# Patient Record
Sex: FEMALE | Race: WHITE | Employment: OTHER | URBAN - METROPOLITAN AREA
[De-identification: names, ages, dates, MRNs, and addresses within clinical notes are randomized per-mention and may not be internally consistent; named-entity substitution may affect disease eponyms.]

---

## 2017-02-01 ENCOUNTER — HOSPITAL ENCOUNTER (OUTPATIENT)
Dept: PHARMACY | Age: 82
Discharge: HOME | End: 2017-02-01
Payer: MEDICARE

## 2017-02-01 DIAGNOSIS — I48.91 ATRIAL FIBRILLATION, UNSPECIFIED TYPE (HCC): ICD-10-CM

## 2017-02-01 LAB
INR BLD: 3.7
PROTIME: 44.8 SECONDS

## 2017-02-01 PROCEDURE — G0463 HOSPITAL OUTPT CLINIC VISIT: HCPCS | Performed by: PHARMACIST

## 2017-02-01 PROCEDURE — 85610 PROTHROMBIN TIME: CPT | Performed by: PHARMACIST

## 2017-02-15 ENCOUNTER — HOSPITAL ENCOUNTER (OUTPATIENT)
Dept: PHARMACY | Age: 82
Discharge: HOME | End: 2017-02-15
Payer: MEDICARE

## 2017-02-15 DIAGNOSIS — I48.91 ATRIAL FIBRILLATION, UNSPECIFIED TYPE (HCC): ICD-10-CM

## 2017-02-15 LAB
INR BLD: 2.7
PROTIME: 32 SECONDS

## 2017-02-15 PROCEDURE — 85610 PROTHROMBIN TIME: CPT

## 2017-02-15 PROCEDURE — G0463 HOSPITAL OUTPT CLINIC VISIT: HCPCS

## 2017-03-09 ENCOUNTER — HOSPITAL ENCOUNTER (OUTPATIENT)
Dept: PHARMACY | Age: 82
Discharge: HOME | End: 2017-03-09
Payer: MEDICARE

## 2017-03-09 DIAGNOSIS — I48.91 ATRIAL FIBRILLATION, UNSPECIFIED TYPE (HCC): ICD-10-CM

## 2017-03-09 LAB
INR BLD: 4.5
PROTIME: 53.7 SECONDS

## 2017-03-09 PROCEDURE — G0463 HOSPITAL OUTPT CLINIC VISIT: HCPCS | Performed by: PHARMACIST

## 2017-03-09 PROCEDURE — 85610 PROTHROMBIN TIME: CPT | Performed by: PHARMACIST

## 2017-03-09 RX ORDER — WARFARIN SODIUM 2 MG/1
TABLET ORAL
Qty: 10 TABLET | Refills: 0 | Status: SHIPPED | OUTPATIENT
Start: 2017-03-09 | End: 2017-05-30 | Stop reason: DRUGHIGH

## 2017-03-13 ENCOUNTER — HOSPITAL ENCOUNTER (OUTPATIENT)
Dept: PHARMACY | Age: 82
Discharge: HOME | End: 2017-03-13
Payer: MEDICARE

## 2017-03-13 DIAGNOSIS — I48.91 ATRIAL FIBRILLATION, UNSPECIFIED TYPE (HCC): ICD-10-CM

## 2017-03-13 LAB
INR BLD: 4
PROTIME: 47.7 SECONDS

## 2017-03-13 PROCEDURE — 85610 PROTHROMBIN TIME: CPT

## 2017-03-13 PROCEDURE — G0463 HOSPITAL OUTPT CLINIC VISIT: HCPCS

## 2017-03-13 RX ORDER — PROPAFENONE HYDROCHLORIDE 300 MG/1
275 TABLET, COATED ORAL EVERY 8 HOURS
COMMUNITY
End: 2017-09-28

## 2017-03-20 ENCOUNTER — HOSPITAL ENCOUNTER (OUTPATIENT)
Dept: WOMENS IMAGING | Age: 82
Discharge: HOME | End: 2017-03-20
Payer: MEDICARE

## 2017-03-20 DIAGNOSIS — Z12.39 BREAST CANCER SCREENING: ICD-10-CM

## 2017-03-20 PROCEDURE — G0202 SCR MAMMO BI INCL CAD: HCPCS

## 2017-03-27 ENCOUNTER — HOSPITAL ENCOUNTER (OUTPATIENT)
Dept: PHARMACY | Age: 82
Discharge: HOME | End: 2017-03-27
Payer: MEDICARE

## 2017-03-27 DIAGNOSIS — I48.91 ATRIAL FIBRILLATION, UNSPECIFIED TYPE (HCC): ICD-10-CM

## 2017-03-27 LAB
INR BLD: 2.9
PROTIME: 34.7 SECONDS

## 2017-03-27 PROCEDURE — G0463 HOSPITAL OUTPT CLINIC VISIT: HCPCS

## 2017-03-27 PROCEDURE — 85610 PROTHROMBIN TIME: CPT

## 2017-04-17 ENCOUNTER — HOSPITAL ENCOUNTER (OUTPATIENT)
Dept: PHARMACY | Age: 82
Discharge: HOME | End: 2017-04-17
Payer: MEDICARE

## 2017-04-17 DIAGNOSIS — I48.91 ATRIAL FIBRILLATION, UNSPECIFIED TYPE (HCC): ICD-10-CM

## 2017-04-17 LAB
INR BLD: 3
PROTIME: 35.5 SECONDS

## 2017-04-17 PROCEDURE — 85610 PROTHROMBIN TIME: CPT

## 2017-04-17 PROCEDURE — G0463 HOSPITAL OUTPT CLINIC VISIT: HCPCS

## 2017-04-17 RX ORDER — AMLODIPINE BESYLATE 2.5 MG/1
2.5 TABLET ORAL DAILY
COMMUNITY
End: 2017-04-17 | Stop reason: CLARIF

## 2017-04-17 RX ORDER — AMLODIPINE BESYLATE 2.5 MG/1
2.5 TABLET ORAL DAILY
Status: DISCONTINUED | OUTPATIENT
Start: 2017-04-17 | End: 2017-04-17 | Stop reason: CLARIF

## 2017-04-17 RX ORDER — AMLODIPINE BESYLATE 2.5 MG/1
2.5 TABLET ORAL DAILY
COMMUNITY
End: 2018-12-28 | Stop reason: ALTCHOICE

## 2017-04-17 RX ORDER — AMLODIPINE BESYLATE 2.5 MG/1
2.5 TABLET ORAL DAILY
Status: CANCELLED | OUTPATIENT
Start: 2017-04-17

## 2017-05-08 ENCOUNTER — HOSPITAL ENCOUNTER (OUTPATIENT)
Dept: PHARMACY | Age: 82
Discharge: HOME | End: 2017-05-08
Payer: MEDICARE

## 2017-05-08 DIAGNOSIS — I48.91 ATRIAL FIBRILLATION, UNSPECIFIED TYPE (HCC): ICD-10-CM

## 2017-05-08 LAB
INR BLD: 3.1
PROTIME: 37.3 SECONDS

## 2017-05-08 PROCEDURE — 99211 OFF/OP EST MAY X REQ PHY/QHP: CPT

## 2017-05-08 PROCEDURE — 85610 PROTHROMBIN TIME: CPT

## 2017-05-30 ENCOUNTER — HOSPITAL ENCOUNTER (OUTPATIENT)
Dept: PHARMACY | Age: 82
Discharge: HOME | End: 2017-05-30
Payer: MEDICARE

## 2017-05-30 DIAGNOSIS — I48.91 ATRIAL FIBRILLATION, UNSPECIFIED TYPE (HCC): ICD-10-CM

## 2017-05-30 LAB
INR BLD: 2.2
PROTIME: 26.3 SECONDS

## 2017-05-30 PROCEDURE — 99211 OFF/OP EST MAY X REQ PHY/QHP: CPT

## 2017-05-30 PROCEDURE — 85610 PROTHROMBIN TIME: CPT

## 2017-05-30 RX ORDER — WARFARIN SODIUM 2.5 MG/1
TABLET ORAL
Qty: 90 TABLET | Refills: 1 | Status: SHIPPED | OUTPATIENT
Start: 2017-05-30 | End: 2017-12-01 | Stop reason: SDUPTHER

## 2017-06-27 ENCOUNTER — HOSPITAL ENCOUNTER (OUTPATIENT)
Dept: PHARMACY | Age: 82
Setting detail: THERAPIES SERIES
Discharge: HOME | End: 2017-06-27
Payer: MEDICARE

## 2017-06-27 DIAGNOSIS — I48.91 ATRIAL FIBRILLATION, UNSPECIFIED TYPE (HCC): ICD-10-CM

## 2017-06-27 LAB
INR BLD: 2.4
PROTIME: 29.3 SECONDS

## 2017-06-27 PROCEDURE — 99211 OFF/OP EST MAY X REQ PHY/QHP: CPT

## 2017-06-27 PROCEDURE — 85610 PROTHROMBIN TIME: CPT

## 2017-08-03 ENCOUNTER — HOSPITAL ENCOUNTER (OUTPATIENT)
Dept: PHARMACY | Age: 82
Setting detail: THERAPIES SERIES
Discharge: HOME | End: 2017-08-03
Payer: MEDICARE

## 2017-08-03 DIAGNOSIS — I48.91 ATRIAL FIBRILLATION, UNSPECIFIED TYPE (HCC): ICD-10-CM

## 2017-08-03 LAB
INR BLD: 1.8
PROTIME: 22.2 SECONDS

## 2017-08-03 PROCEDURE — 99211 OFF/OP EST MAY X REQ PHY/QHP: CPT

## 2017-08-03 PROCEDURE — 85610 PROTHROMBIN TIME: CPT

## 2017-09-07 ENCOUNTER — HOSPITAL ENCOUNTER (OUTPATIENT)
Dept: PHARMACY | Age: 82
Setting detail: THERAPIES SERIES
Discharge: HOME | End: 2017-09-07
Payer: MEDICARE

## 2017-09-07 DIAGNOSIS — I48.91 ATRIAL FIBRILLATION, UNSPECIFIED TYPE (HCC): ICD-10-CM

## 2017-09-07 LAB
INR BLD: 1.9
PROTIME: 23.3 SECONDS

## 2017-09-07 PROCEDURE — 85610 PROTHROMBIN TIME: CPT

## 2017-09-07 PROCEDURE — 99211 OFF/OP EST MAY X REQ PHY/QHP: CPT

## 2017-09-08 ENCOUNTER — ANTI-COAG VISIT (OUTPATIENT)
Dept: PHARMACY | Age: 82
End: 2017-09-08

## 2017-09-08 DIAGNOSIS — I48.91 ATRIAL FIBRILLATION, UNSPECIFIED TYPE (HCC): ICD-10-CM

## 2017-09-26 ENCOUNTER — HOSPITAL ENCOUNTER (OUTPATIENT)
Dept: LAB | Age: 82
Discharge: HOME | End: 2017-09-26
Payer: MEDICARE

## 2017-09-26 LAB
ANION GAP SERPL CALCULATED.3IONS-SCNC: 19 MEQ/L (ref 7–13)
BUN BLDV-MCNC: 23 MG/DL (ref 8–23)
CALCIUM SERPL-MCNC: 9.7 MG/DL (ref 8.6–10.2)
CHLORIDE BLD-SCNC: 103 MEQ/L (ref 98–107)
CO2: 24 MEQ/L (ref 22–29)
CREAT SERPL-MCNC: 0.86 MG/DL (ref 0.5–0.9)
GFR AFRICAN AMERICAN: >60
GFR NON-AFRICAN AMERICAN: >60
GLUCOSE BLD-MCNC: 69 MG/DL (ref 74–109)
POTASSIUM SERPL-SCNC: 5.4 MEQ/L (ref 3.5–5.1)
SODIUM BLD-SCNC: 146 MEQ/L (ref 132–144)

## 2017-09-26 PROCEDURE — 80048 BASIC METABOLIC PNL TOTAL CA: CPT

## 2017-09-28 ENCOUNTER — HOSPITAL ENCOUNTER (OUTPATIENT)
Dept: PHARMACY | Age: 82
Setting detail: THERAPIES SERIES
Discharge: HOME | End: 2017-09-28
Payer: MEDICARE

## 2017-09-28 DIAGNOSIS — I48.91 ATRIAL FIBRILLATION, UNSPECIFIED TYPE (HCC): ICD-10-CM

## 2017-09-28 LAB
INR BLD: 1.6
PROTIME: 18.9 SECONDS

## 2017-09-28 PROCEDURE — 85610 PROTHROMBIN TIME: CPT

## 2017-09-28 PROCEDURE — 99211 OFF/OP EST MAY X REQ PHY/QHP: CPT

## 2017-09-28 RX ORDER — SOTALOL HYDROCHLORIDE 120 MG/1
120 TABLET ORAL 2 TIMES DAILY
COMMUNITY

## 2017-10-10 ENCOUNTER — HOSPITAL ENCOUNTER (OUTPATIENT)
Dept: LAB | Age: 82
Discharge: HOME | End: 2017-10-10
Payer: MEDICARE

## 2017-10-10 LAB
ANION GAP SERPL CALCULATED.3IONS-SCNC: 16 MEQ/L (ref 7–13)
BUN BLDV-MCNC: 17 MG/DL (ref 8–23)
CALCIUM SERPL-MCNC: 9.6 MG/DL (ref 8.6–10.2)
CHLORIDE BLD-SCNC: 98 MEQ/L (ref 98–107)
CO2: 24 MEQ/L (ref 22–29)
CREAT SERPL-MCNC: 1 MG/DL (ref 0.5–0.9)
GFR AFRICAN AMERICAN: >60
GFR NON-AFRICAN AMERICAN: 52.9
GLUCOSE BLD-MCNC: 96 MG/DL (ref 74–109)
POTASSIUM SERPL-SCNC: 4.8 MEQ/L (ref 3.5–5.1)
SODIUM BLD-SCNC: 138 MEQ/L (ref 132–144)

## 2017-10-10 PROCEDURE — 80048 BASIC METABOLIC PNL TOTAL CA: CPT

## 2017-10-12 ENCOUNTER — HOSPITAL ENCOUNTER (OUTPATIENT)
Dept: PHARMACY | Age: 82
Setting detail: THERAPIES SERIES
Discharge: HOME | End: 2017-10-12
Payer: MEDICARE

## 2017-10-12 DIAGNOSIS — I48.91 ATRIAL FIBRILLATION, UNSPECIFIED TYPE (HCC): ICD-10-CM

## 2017-10-12 LAB
INR BLD: 1.9
PROTIME: 23.2 SECONDS

## 2017-10-12 PROCEDURE — 85610 PROTHROMBIN TIME: CPT | Performed by: PHARMACIST

## 2017-10-12 PROCEDURE — 99211 OFF/OP EST MAY X REQ PHY/QHP: CPT | Performed by: PHARMACIST

## 2017-10-12 NOTE — PROGRESS NOTES
Ms. Nathaniel Fang is a 80 y.o. y/o female with history of Afib who presents today for anticoagulation monitoring and adjustment. INR 1.9 is sl subtherapeutic for this patient (goal range 2-3) and is reflective of 17.5 mg TWD  Patient verifies current dosing regimen, patient able to verbally recall dose  Patient reports no  missed doses since last INR   Patient denies s/sx clotting and/or stroke  Patient denies hematuria, epistaxis, rectal bleeding  Patient denies changes in diet, alcohol, or tobacco use  Reviewed medication list and drug allergies with patient, updated any medication additions or modifications accordingly  Patient also denies any pending medical or dental procedures scheduled at this time  Since patients dosage was adjusted last visit and the INR has increased 0.3 units, patient was instructed to continue 17.5mg TWD and RTC 2 weeks. IF INR is still below 2 the dosage may be increased.

## 2017-10-25 ENCOUNTER — HOSPITAL ENCOUNTER (OUTPATIENT)
Dept: PHARMACY | Age: 82
Setting detail: THERAPIES SERIES
Discharge: HOME | End: 2017-10-25
Payer: MEDICARE

## 2017-10-25 DIAGNOSIS — I48.91 ATRIAL FIBRILLATION, UNSPECIFIED TYPE (HCC): ICD-10-CM

## 2017-10-25 LAB
INR BLD: 1.8
PROTIME: 21.9 SECONDS

## 2017-10-25 PROCEDURE — 99211 OFF/OP EST MAY X REQ PHY/QHP: CPT

## 2017-10-25 PROCEDURE — 85610 PROTHROMBIN TIME: CPT

## 2017-10-25 NOTE — PROGRESS NOTES
Ms. Yeni Galvan is a 80 y.o. y/o female with history of Afib who presents today for anticoagulation monitoring and adjustment. INR 1.8 is subtherapeutic for this patient (goal range 2.0-3.0) and is reflective of 17.5 mg TWD  Patient verifies current dosing regimen, patient able to verbally recall dose  Patient reports zero missed doses since last INR   Patient denies s/sx clotting and/or stroke  Patient denies hematuria, epistaxis, rectal bleeding  Patient denies changes in diet, alcohol, or tobacco use  Reviewed medication list and drug allergies with patient, updated any medication additions or modifications accordingly  Patient also denies any pending medical or dental procedures scheduled at this time  Patient was instructed to start new plan of 20 mg TWD (a 14% increase) and RTC 2 weeks.    Of note: patient is anxious to increase interval of INR visits as patient does not like coming in every 2 weeks

## 2017-11-10 ENCOUNTER — HOSPITAL ENCOUNTER (OUTPATIENT)
Dept: PHARMACY | Age: 82
Setting detail: THERAPIES SERIES
Discharge: HOME | End: 2017-11-10
Payer: MEDICARE

## 2017-11-10 DIAGNOSIS — I48.91 ATRIAL FIBRILLATION, UNSPECIFIED TYPE (HCC): ICD-10-CM

## 2017-11-10 LAB
INR BLD: 2.4
PROTIME: 28.5 SECONDS

## 2017-11-10 PROCEDURE — 85610 PROTHROMBIN TIME: CPT

## 2017-11-10 PROCEDURE — 99211 OFF/OP EST MAY X REQ PHY/QHP: CPT

## 2017-11-10 NOTE — PROGRESS NOTES
Ms. Fer Cramer is a 80 y.o. y/o female with history of Afib who presents today for anticoagulation monitoring and adjustment.   INR 2.4 is therapeutic for this patient (goal range 2-3) and is reflective of 20 mg TWD  Patient verifies current dosing regimen, patient able to verbally recall dose  Patient reports no  missed doses since last INR   Patient denies s/sx clotting and/or stroke  Patient denies hematuria, epistaxis, rectal bleeding  Patient denies changes in diet, alcohol, or tobacco use  Reviewed medication list and drug allergies with patient, updated any medication additions or modifications accordingly  Patient also denies any pending medical or dental procedures scheduled at this time  Patient was instructed to continue current regimen of 20 mg TWD and RTC 3 weeks    Caryle Amas, PharmD  Staff Pharmacist  11/10/2017 8:05 AM

## 2017-11-20 ENCOUNTER — HOSPITAL ENCOUNTER (OUTPATIENT)
Dept: LAB | Age: 82
Discharge: HOME | End: 2017-11-20
Payer: MEDICARE

## 2017-11-20 LAB
ALBUMIN SERPL-MCNC: 4.1 G/DL (ref 3.9–4.9)
ALP BLD-CCNC: 96 U/L (ref 40–130)
ALT SERPL-CCNC: 15 U/L (ref 0–33)
ANION GAP SERPL CALCULATED.3IONS-SCNC: 16 MEQ/L (ref 7–13)
AST SERPL-CCNC: 14 U/L (ref 0–35)
BILIRUB SERPL-MCNC: 0.6 MG/DL (ref 0–1.2)
BUN BLDV-MCNC: 20 MG/DL (ref 8–23)
CALCIUM SERPL-MCNC: 9.8 MG/DL (ref 8.6–10.2)
CHLORIDE BLD-SCNC: 100 MEQ/L (ref 98–107)
CHOLESTEROL, TOTAL: 199 MG/DL (ref 0–199)
CO2: 25 MEQ/L (ref 22–29)
CREAT SERPL-MCNC: 0.93 MG/DL (ref 0.5–0.9)
FOLATE: 11.5 NG/ML (ref 7.3–26.1)
GFR AFRICAN AMERICAN: >60
GFR NON-AFRICAN AMERICAN: 57.5
GLOBULIN: 3.1 G/DL (ref 2.3–3.5)
GLUCOSE BLD-MCNC: 89 MG/DL (ref 74–109)
HDLC SERPL-MCNC: 70 MG/DL (ref 40–59)
LDL CHOLESTEROL CALCULATED: 103 MG/DL (ref 0–129)
POTASSIUM SERPL-SCNC: 5.4 MEQ/L (ref 3.5–5.1)
SODIUM BLD-SCNC: 141 MEQ/L (ref 132–144)
TOTAL PROTEIN: 7.2 G/DL (ref 6.4–8.1)
TRIGL SERPL-MCNC: 131 MG/DL (ref 0–200)
VITAMIN B-12: >2000 PG/ML (ref 211–946)
VITAMIN D 25-HYDROXY: 55.8 NG/ML (ref 30–100)

## 2017-11-20 PROCEDURE — 80053 COMPREHEN METABOLIC PANEL: CPT

## 2017-11-20 PROCEDURE — 82306 VITAMIN D 25 HYDROXY: CPT

## 2017-11-20 PROCEDURE — 82607 VITAMIN B-12: CPT

## 2017-11-20 PROCEDURE — 82746 ASSAY OF FOLIC ACID SERUM: CPT

## 2017-11-20 PROCEDURE — 80061 LIPID PANEL: CPT

## 2017-11-27 ENCOUNTER — HOSPITAL ENCOUNTER (OUTPATIENT)
Dept: LAB | Age: 82
Discharge: HOME | End: 2017-11-27
Payer: MEDICARE

## 2017-11-27 LAB
ANION GAP SERPL CALCULATED.3IONS-SCNC: 14 MEQ/L (ref 7–13)
BUN BLDV-MCNC: 24 MG/DL (ref 8–23)
CALCIUM SERPL-MCNC: 9.5 MG/DL (ref 8.6–10.2)
CHLORIDE BLD-SCNC: 99 MEQ/L (ref 98–107)
CO2: 26 MEQ/L (ref 22–29)
CREAT SERPL-MCNC: 0.99 MG/DL (ref 0.5–0.9)
GFR AFRICAN AMERICAN: >60
GFR NON-AFRICAN AMERICAN: 53.5
GLUCOSE BLD-MCNC: 91 MG/DL (ref 74–109)
POTASSIUM SERPL-SCNC: 5.2 MEQ/L (ref 3.5–5.1)
SODIUM BLD-SCNC: 139 MEQ/L (ref 132–144)

## 2017-11-27 PROCEDURE — 80048 BASIC METABOLIC PNL TOTAL CA: CPT

## 2017-12-01 ENCOUNTER — HOSPITAL ENCOUNTER (OUTPATIENT)
Dept: PHARMACY | Age: 82
Setting detail: THERAPIES SERIES
Discharge: HOME | End: 2017-12-01
Payer: MEDICARE

## 2017-12-01 DIAGNOSIS — I48.91 ATRIAL FIBRILLATION, UNSPECIFIED TYPE (HCC): ICD-10-CM

## 2017-12-01 LAB
INR BLD: 2.5
PROTIME: 30.6 SECONDS

## 2017-12-01 PROCEDURE — 99211 OFF/OP EST MAY X REQ PHY/QHP: CPT

## 2017-12-01 PROCEDURE — 85610 PROTHROMBIN TIME: CPT

## 2017-12-01 RX ORDER — WARFARIN SODIUM 2.5 MG/1
TABLET ORAL
Qty: 120 TABLET | Refills: 1 | Status: SHIPPED | OUTPATIENT
Start: 2017-12-01 | End: 2018-06-01 | Stop reason: SDUPTHER

## 2017-12-01 NOTE — PROGRESS NOTES
Ms. Maribel Reyes is a 80 y.o. y/o female with history of Afib who presents today for anticoagulation monitoring and adjustment.   INR 2.5 is therapeutic for this patient (goal range 2-3) and is reflective of 20 mg TWD  Patient verifies current dosing regimen, patient able to verbally recall dose  Patient reports no  missed doses since last INR   Patient denies s/sx clotting and/or stroke  Patient denies hematuria, epistaxis, rectal bleeding  Patient denies changes in diet, alcohol, or tobacco use  Reviewed medication list and drug allergies with patient, updated any medication additions or modifications accordingly  Patient also denies any pending medical or dental procedures scheduled at this time  Patient was instructed to continue current regimen of 20 mg TWD and RTC 3 weeks    Gilson Moss, PharmD  Staff Pharmacist  12/1/2017 8:28 AM

## 2017-12-22 ENCOUNTER — HOSPITAL ENCOUNTER (OUTPATIENT)
Dept: PHARMACY | Age: 82
Setting detail: THERAPIES SERIES
Discharge: HOME | End: 2017-12-22
Payer: MEDICARE

## 2017-12-22 DIAGNOSIS — I48.91 ATRIAL FIBRILLATION, UNSPECIFIED TYPE (HCC): ICD-10-CM

## 2017-12-22 LAB
INR BLD: 2.1
PROTIME: 25.3 SECONDS

## 2017-12-22 PROCEDURE — 85610 PROTHROMBIN TIME: CPT

## 2017-12-22 PROCEDURE — 99211 OFF/OP EST MAY X REQ PHY/QHP: CPT

## 2018-01-19 ENCOUNTER — HOSPITAL ENCOUNTER (OUTPATIENT)
Dept: PHARMACY | Age: 83
Setting detail: THERAPIES SERIES
Discharge: HOME | End: 2018-01-19
Payer: MEDICARE

## 2018-01-19 DIAGNOSIS — I48.91 ATRIAL FIBRILLATION, UNSPECIFIED TYPE (HCC): ICD-10-CM

## 2018-01-19 LAB
INR BLD: 3.8
PROTIME: 45.8 SECONDS

## 2018-01-19 PROCEDURE — 85610 PROTHROMBIN TIME: CPT

## 2018-01-19 PROCEDURE — 99211 OFF/OP EST MAY X REQ PHY/QHP: CPT

## 2018-02-16 ENCOUNTER — HOSPITAL ENCOUNTER (OUTPATIENT)
Dept: PHARMACY | Age: 83
Setting detail: THERAPIES SERIES
Discharge: HOME | End: 2018-02-16
Payer: MEDICARE

## 2018-02-16 DIAGNOSIS — I48.91 ATRIAL FIBRILLATION, UNSPECIFIED TYPE (HCC): ICD-10-CM

## 2018-02-16 LAB
INR BLD: 2.6
PROTIME: 31.8 SECONDS

## 2018-02-16 PROCEDURE — 99211 OFF/OP EST MAY X REQ PHY/QHP: CPT

## 2018-02-16 PROCEDURE — 85610 PROTHROMBIN TIME: CPT

## 2018-03-16 ENCOUNTER — HOSPITAL ENCOUNTER (OUTPATIENT)
Dept: PHARMACY | Age: 83
Setting detail: THERAPIES SERIES
Discharge: HOME | End: 2018-03-16
Payer: MEDICARE

## 2018-03-16 DIAGNOSIS — I48.91 ATRIAL FIBRILLATION, UNSPECIFIED TYPE (HCC): ICD-10-CM

## 2018-03-16 LAB
INR BLD: 2.4
PROTIME: 28.7 SECONDS

## 2018-03-16 PROCEDURE — 99211 OFF/OP EST MAY X REQ PHY/QHP: CPT

## 2018-03-16 PROCEDURE — 85610 PROTHROMBIN TIME: CPT

## 2018-04-10 ENCOUNTER — HOSPITAL ENCOUNTER (OUTPATIENT)
Dept: LAB | Age: 83
Discharge: HOME | End: 2018-04-10
Payer: MEDICARE

## 2018-04-10 LAB
ANION GAP SERPL CALCULATED.3IONS-SCNC: 19 MEQ/L (ref 7–13)
BUN BLDV-MCNC: 16 MG/DL (ref 8–23)
CALCIUM SERPL-MCNC: 9.8 MG/DL (ref 8.6–10.2)
CHLORIDE BLD-SCNC: 97 MEQ/L (ref 98–107)
CO2: 24 MEQ/L (ref 22–29)
CREAT SERPL-MCNC: 0.92 MG/DL (ref 0.5–0.9)
GFR AFRICAN AMERICAN: >60
GFR NON-AFRICAN AMERICAN: 58.2
GLUCOSE BLD-MCNC: 89 MG/DL (ref 74–109)
POTASSIUM SERPL-SCNC: 5.3 MEQ/L (ref 3.5–5.1)
SODIUM BLD-SCNC: 140 MEQ/L (ref 132–144)

## 2018-04-10 PROCEDURE — 80048 BASIC METABOLIC PNL TOTAL CA: CPT

## 2018-04-20 ENCOUNTER — HOSPITAL ENCOUNTER (OUTPATIENT)
Dept: PHARMACY | Age: 83
Setting detail: THERAPIES SERIES
Discharge: HOME | End: 2018-04-20
Payer: MEDICARE

## 2018-04-20 DIAGNOSIS — I48.91 ATRIAL FIBRILLATION, UNSPECIFIED TYPE (HCC): ICD-10-CM

## 2018-04-20 LAB
INR BLD: 2.8
PROTIME: 34 SECONDS

## 2018-04-20 PROCEDURE — 99211 OFF/OP EST MAY X REQ PHY/QHP: CPT | Performed by: PHARMACIST

## 2018-04-20 PROCEDURE — 85610 PROTHROMBIN TIME: CPT | Performed by: PHARMACIST

## 2018-06-01 ENCOUNTER — HOSPITAL ENCOUNTER (OUTPATIENT)
Dept: PHARMACY | Age: 83
Setting detail: THERAPIES SERIES
Discharge: HOME | End: 2018-06-01
Payer: MEDICARE

## 2018-06-01 DIAGNOSIS — I48.91 ATRIAL FIBRILLATION, UNSPECIFIED TYPE (HCC): ICD-10-CM

## 2018-06-01 LAB
INR BLD: 2.1
PROTIME: 25.6 SECONDS

## 2018-06-01 PROCEDURE — 99211 OFF/OP EST MAY X REQ PHY/QHP: CPT | Performed by: PHARMACIST

## 2018-06-01 PROCEDURE — 85610 PROTHROMBIN TIME: CPT | Performed by: PHARMACIST

## 2018-06-01 RX ORDER — WARFARIN SODIUM 2.5 MG/1
TABLET ORAL
Qty: 120 TABLET | Refills: 1 | Status: SHIPPED | OUTPATIENT
Start: 2018-06-01 | End: 2019-01-18 | Stop reason: SDUPTHER

## 2018-06-14 ENCOUNTER — HOSPITAL ENCOUNTER (OUTPATIENT)
Dept: PULMONOLOGY | Age: 83
Discharge: HOME | End: 2018-06-14
Payer: MEDICARE

## 2018-06-14 PROCEDURE — 94060 EVALUATION OF WHEEZING: CPT

## 2018-06-14 PROCEDURE — 6360000002 HC RX W HCPCS: Performed by: INTERNAL MEDICINE

## 2018-06-14 PROCEDURE — 94060 EVALUATION OF WHEEZING: CPT | Performed by: INTERNAL MEDICINE

## 2018-06-14 RX ORDER — ALBUTEROL SULFATE 2.5 MG/3ML
2.5 SOLUTION RESPIRATORY (INHALATION) ONCE
Status: COMPLETED | OUTPATIENT
Start: 2018-06-14 | End: 2018-06-14

## 2018-06-14 RX ADMIN — ALBUTEROL SULFATE 2.5 MG: 2.5 SOLUTION RESPIRATORY (INHALATION) at 09:40

## 2018-07-13 ENCOUNTER — HOSPITAL ENCOUNTER (OUTPATIENT)
Dept: PHARMACY | Age: 83
Setting detail: THERAPIES SERIES
Discharge: HOME | End: 2018-07-13
Payer: MEDICARE

## 2018-07-13 DIAGNOSIS — I48.91 ATRIAL FIBRILLATION, UNSPECIFIED TYPE (HCC): ICD-10-CM

## 2018-07-13 LAB
INR BLD: 2.6
PROTIME: 30.7 SECONDS

## 2018-07-13 PROCEDURE — 99211 OFF/OP EST MAY X REQ PHY/QHP: CPT

## 2018-07-13 PROCEDURE — 85610 PROTHROMBIN TIME: CPT

## 2018-07-13 NOTE — PROGRESS NOTES
Ms. Cynthia Cervantes is a 80 y.o. y/o female with history of Afib who presents today for anticoagulation monitoring and adjustment.   INR 2.6 is therapeutic for this patient (goal range 2-3) and is reflective of 20 mg TWD  Patient verifies current dosing regimen, patient able to verbally recall dose  Patient reports 0  missed doses since last INR   Patient denies s/sx clotting and/or stroke  Patient denies hematuria, epistaxis, rectal bleeding  Patient denies changes in diet, alcohol, or tobacco use  Reviewed medication list and drug allergies with patient, updated any medication additions or modifications accordingly  Patient also denies any pending medical or dental procedures scheduled at this time  Patient was instructed to continue 20 mg TWD and RTC 6 weeks

## 2018-08-24 ENCOUNTER — HOSPITAL ENCOUNTER (OUTPATIENT)
Dept: PHARMACY | Age: 83
Setting detail: THERAPIES SERIES
Discharge: HOME | End: 2018-08-24
Payer: MEDICARE

## 2018-08-24 DIAGNOSIS — I48.91 ATRIAL FIBRILLATION, UNSPECIFIED TYPE (HCC): ICD-10-CM

## 2018-08-24 LAB
INR BLD: 2.6
PROTIME: 30.9 SECONDS

## 2018-08-24 PROCEDURE — 85610 PROTHROMBIN TIME: CPT | Performed by: PHARMACIST

## 2018-08-24 PROCEDURE — 99211 OFF/OP EST MAY X REQ PHY/QHP: CPT | Performed by: PHARMACIST

## 2018-09-20 ENCOUNTER — HOSPITAL ENCOUNTER (OUTPATIENT)
Dept: LAB | Age: 83
Discharge: HOME | End: 2018-09-20
Payer: MEDICARE

## 2018-09-20 LAB
ALBUMIN SERPL-MCNC: 4.5 G/DL (ref 3.9–4.9)
ALP BLD-CCNC: 107 U/L (ref 40–130)
ALT SERPL-CCNC: 14 U/L (ref 0–33)
ANION GAP SERPL CALCULATED.3IONS-SCNC: 22 MEQ/L (ref 7–13)
AST SERPL-CCNC: 14 U/L (ref 0–35)
BILIRUB SERPL-MCNC: 0.6 MG/DL (ref 0–1.2)
BUN BLDV-MCNC: 15 MG/DL (ref 8–23)
CALCIUM SERPL-MCNC: 10.1 MG/DL (ref 8.6–10.2)
CHLORIDE BLD-SCNC: 101 MEQ/L (ref 98–107)
CHOLESTEROL, TOTAL: 196 MG/DL (ref 0–199)
CO2: 21 MEQ/L (ref 22–29)
CREAT SERPL-MCNC: 0.75 MG/DL (ref 0.5–0.9)
GFR AFRICAN AMERICAN: >60
GFR NON-AFRICAN AMERICAN: >60
GLOBULIN: 3.1 G/DL (ref 2.3–3.5)
GLUCOSE BLD-MCNC: 94 MG/DL (ref 74–109)
HCT VFR BLD CALC: 41.6 % (ref 37–47)
HDLC SERPL-MCNC: 69 MG/DL (ref 40–59)
HEMOGLOBIN: 13.7 G/DL (ref 12–16)
LDL CHOLESTEROL CALCULATED: 103 MG/DL (ref 0–129)
MCH RBC QN AUTO: 32.2 PG (ref 27–31.3)
MCHC RBC AUTO-ENTMCNC: 33 % (ref 33–37)
MCV RBC AUTO: 97.6 FL (ref 82–100)
PDW BLD-RTO: 14.5 % (ref 11.5–14.5)
PLATELET # BLD: 203 K/UL (ref 130–400)
POTASSIUM SERPL-SCNC: 4.3 MEQ/L (ref 3.5–5.1)
RBC # BLD: 4.26 M/UL (ref 4.2–5.4)
SODIUM BLD-SCNC: 144 MEQ/L (ref 132–144)
TOTAL PROTEIN: 7.6 G/DL (ref 6.4–8.1)
TRIGL SERPL-MCNC: 121 MG/DL (ref 0–200)
WBC # BLD: 5.2 K/UL (ref 4.8–10.8)

## 2018-09-20 PROCEDURE — 80061 LIPID PANEL: CPT

## 2018-09-20 PROCEDURE — 85027 COMPLETE CBC AUTOMATED: CPT

## 2018-09-20 PROCEDURE — 80053 COMPREHEN METABOLIC PANEL: CPT

## 2018-10-05 ENCOUNTER — HOSPITAL ENCOUNTER (OUTPATIENT)
Dept: PHARMACY | Age: 83
Setting detail: THERAPIES SERIES
Discharge: HOME | End: 2018-10-05
Payer: MEDICARE

## 2018-10-05 LAB
INR BLD: 2.5
PROTIME: 30.1 SECONDS

## 2018-10-05 PROCEDURE — 99211 OFF/OP EST MAY X REQ PHY/QHP: CPT

## 2018-10-05 PROCEDURE — 85610 PROTHROMBIN TIME: CPT

## 2018-10-05 NOTE — PROGRESS NOTES
Ms. Collette Pester is a 80 y.o. y/o female with history of Afib who presents today for anticoagulation monitoring and adjustment.   INR 2.5 is therapeutic for this patient (goal range 2-3) and is reflective of 20 mg TWD  Patient verifies current dosing regimen, patient able to verbally recall dose  Patient reports 0 missed doses since last INR   Patient denies s/sx clotting and/or stroke  Patient denies hematuria, epistaxis, rectal bleeding  Patient denies changes in diet, alcohol, or tobacco use  Reviewed medication list and drug allergies with patient, updated any medication additions or modifications accordingly  Patient also denies any pending medical or dental procedures scheduled at this time  Patient was instructed to continue 20 mg TWD and RTC 6 weeks    Andrew Haney, PharmD  10/5/18 8:24 AM

## 2018-10-25 ENCOUNTER — HOSPITAL ENCOUNTER (OUTPATIENT)
Dept: WOMENS IMAGING | Age: 83
Discharge: HOME | End: 2018-10-27
Payer: MEDICARE

## 2018-10-25 DIAGNOSIS — Z12.31 ENCOUNTER FOR SCREENING MAMMOGRAM FOR BREAST CANCER: ICD-10-CM

## 2018-10-25 PROCEDURE — 77067 SCR MAMMO BI INCL CAD: CPT

## 2018-11-16 ENCOUNTER — HOSPITAL ENCOUNTER (OUTPATIENT)
Dept: PHARMACY | Age: 83
Setting detail: THERAPIES SERIES
Discharge: HOME | End: 2018-11-16
Payer: MEDICARE

## 2018-11-16 DIAGNOSIS — I48.91 ATRIAL FIBRILLATION, UNSPECIFIED TYPE (HCC): ICD-10-CM

## 2018-11-16 PROCEDURE — 99211 OFF/OP EST MAY X REQ PHY/QHP: CPT

## 2018-11-16 PROCEDURE — 85610 PROTHROMBIN TIME: CPT

## 2018-12-28 ENCOUNTER — HOSPITAL ENCOUNTER (OUTPATIENT)
Dept: PHARMACY | Age: 83
Setting detail: THERAPIES SERIES
Discharge: HOME | End: 2018-12-28
Payer: MEDICARE

## 2018-12-28 DIAGNOSIS — I48.91 ATRIAL FIBRILLATION, UNSPECIFIED TYPE (HCC): ICD-10-CM

## 2018-12-28 LAB — INTERNATIONAL NORMALIZATION RATIO, POC: 2.7

## 2018-12-28 PROCEDURE — 99211 OFF/OP EST MAY X REQ PHY/QHP: CPT | Performed by: PHARMACIST

## 2018-12-28 PROCEDURE — 85610 PROTHROMBIN TIME: CPT | Performed by: PHARMACIST

## 2019-01-18 RX ORDER — WARFARIN SODIUM 2.5 MG/1
TABLET ORAL
Qty: 120 TABLET | Refills: 1 | Status: SHIPPED | OUTPATIENT
Start: 2019-01-18 | End: 2019-08-05 | Stop reason: SDUPTHER

## 2019-02-05 ENCOUNTER — HOSPITAL ENCOUNTER (OUTPATIENT)
Dept: PHARMACY | Age: 84
Setting detail: THERAPIES SERIES
Discharge: HOME | End: 2019-02-05
Payer: MEDICARE

## 2019-02-05 DIAGNOSIS — I48.91 ATRIAL FIBRILLATION, UNSPECIFIED TYPE (HCC): ICD-10-CM

## 2019-02-05 LAB — INTERNATIONAL NORMALIZATION RATIO, POC: 3

## 2019-02-05 PROCEDURE — 99211 OFF/OP EST MAY X REQ PHY/QHP: CPT | Performed by: PHARMACIST

## 2019-02-05 PROCEDURE — 85610 PROTHROMBIN TIME: CPT | Performed by: PHARMACIST

## 2019-03-18 ENCOUNTER — HOSPITAL ENCOUNTER (OUTPATIENT)
Dept: LAB | Age: 84
Discharge: HOME | End: 2019-03-18
Payer: MEDICARE

## 2019-03-18 LAB
ALBUMIN SERPL-MCNC: 4.1 G/DL (ref 3.5–4.6)
ALP BLD-CCNC: 107 U/L (ref 40–130)
ALT SERPL-CCNC: 12 U/L (ref 0–33)
ANION GAP SERPL CALCULATED.3IONS-SCNC: 19 MEQ/L (ref 9–15)
AST SERPL-CCNC: 14 U/L (ref 0–35)
BILIRUB SERPL-MCNC: 0.7 MG/DL (ref 0.2–0.7)
BUN BLDV-MCNC: 19 MG/DL (ref 8–23)
CALCIUM SERPL-MCNC: 9.8 MG/DL (ref 8.5–9.9)
CHLORIDE BLD-SCNC: 100 MEQ/L (ref 95–107)
CHOLESTEROL, TOTAL: 183 MG/DL (ref 0–199)
CO2: 24 MEQ/L (ref 20–31)
CREAT SERPL-MCNC: 0.96 MG/DL (ref 0.5–0.9)
GFR AFRICAN AMERICAN: >60
GFR NON-AFRICAN AMERICAN: 55.3
GLOBULIN: 3.5 G/DL (ref 2.3–3.5)
GLUCOSE BLD-MCNC: 84 MG/DL (ref 70–99)
HDLC SERPL-MCNC: 70 MG/DL (ref 40–59)
LDL CHOLESTEROL CALCULATED: 91 MG/DL (ref 0–129)
POTASSIUM SERPL-SCNC: 4.2 MEQ/L (ref 3.4–4.9)
SODIUM BLD-SCNC: 143 MEQ/L (ref 135–144)
TOTAL PROTEIN: 7.6 G/DL (ref 6.3–8)
TRIGL SERPL-MCNC: 109 MG/DL (ref 0–150)

## 2019-03-18 PROCEDURE — 80053 COMPREHEN METABOLIC PANEL: CPT

## 2019-03-18 PROCEDURE — 80061 LIPID PANEL: CPT

## 2019-03-19 ENCOUNTER — HOSPITAL ENCOUNTER (OUTPATIENT)
Dept: PHARMACY | Age: 84
Setting detail: THERAPIES SERIES
Discharge: HOME | End: 2019-03-19
Payer: MEDICARE

## 2019-03-19 DIAGNOSIS — I48.91 ATRIAL FIBRILLATION, UNSPECIFIED TYPE (HCC): ICD-10-CM

## 2019-03-19 LAB — INTERNATIONAL NORMALIZATION RATIO, POC: 3

## 2019-03-19 PROCEDURE — 85610 PROTHROMBIN TIME: CPT

## 2019-03-19 PROCEDURE — 99211 OFF/OP EST MAY X REQ PHY/QHP: CPT

## 2019-04-30 ENCOUNTER — HOSPITAL ENCOUNTER (OUTPATIENT)
Dept: PHARMACY | Age: 84
Setting detail: THERAPIES SERIES
Discharge: HOME | End: 2019-04-30
Payer: MEDICARE

## 2019-04-30 DIAGNOSIS — I48.91 ATRIAL FIBRILLATION, UNSPECIFIED TYPE (HCC): ICD-10-CM

## 2019-04-30 LAB — INTERNATIONAL NORMALIZATION RATIO, POC: 2.9

## 2019-04-30 PROCEDURE — 85610 PROTHROMBIN TIME: CPT

## 2019-04-30 PROCEDURE — 99211 OFF/OP EST MAY X REQ PHY/QHP: CPT

## 2019-04-30 NOTE — PROGRESS NOTES
Ms. Hernan Ortiz is a 80 y.o. y/o female with history of Afib who presents today for anticoagulation monitoring and adjustment.   INR 2.9 is therapeutic for this patient (goal range 2-3) and is reflective of 20 mg TWD  Patient verifies current dosing regimen, patient able to verbally recall dose  Patient reports 0  missed doses since last INR   Patient denies s/sx clotting and/or stroke  Patient denies hematuria, epistaxis, rectal bleeding  Patient denies changes in diet, alcohol, or tobacco use  Reviewed medication list and drug allergies with patient, updated any medication additions or modifications accordingly  Patient also denies any pending medical or dental procedures scheduled at this time  Patient was instructed to continue 20 mg TWD and RTC 6 weeks

## 2019-06-07 ENCOUNTER — HOSPITAL ENCOUNTER (OUTPATIENT)
Dept: PHARMACY | Age: 84
Setting detail: THERAPIES SERIES
Discharge: HOME | End: 2019-06-07
Payer: MEDICARE

## 2019-06-07 DIAGNOSIS — I48.91 ATRIAL FIBRILLATION, UNSPECIFIED TYPE (HCC): ICD-10-CM

## 2019-06-07 PROCEDURE — 85610 PROTHROMBIN TIME: CPT

## 2019-06-07 PROCEDURE — 99211 OFF/OP EST MAY X REQ PHY/QHP: CPT

## 2019-06-07 NOTE — PROGRESS NOTES
Ms. Lawrence Plummer is a 80 y.o. y/o female with history of Afib who presents today for anticoagulation monitoring and adjustment.   INR 3.4 is supra-therapeutic for this patient (goal range 2-3) and is reflective of 20 mg TWD  Patient verifies current dosing regimen, patient able to verbally recall dose  Patient reports 0  missed doses since last INR   Patient denies s/sx clotting and/or stroke  Patient denies hematuria, epistaxis, rectal bleeding  Patient denies changes in diet, alcohol, or tobacco use  Reviewed medication list and drug allergies with patient, updated any medication additions or modifications accordingly-Patient is on day 5 of 7 days of doxycycline therapy due to possible infection (per patient, no pneumonia)   Patient also denies any pending medical or dental procedures scheduled at this time  Patient was instructed to hold warfarin therapy today only, then continue 20mg TWD and RTC 3 weeks, unless patient does not feel better and returns to see her primary care physician and is prescribed another antimicrobial agent     Lyric Gaspar, PharmD   6/7/2019 10:38 AM

## 2019-06-28 ENCOUNTER — HOSPITAL ENCOUNTER (OUTPATIENT)
Dept: PHARMACY | Age: 84
Setting detail: THERAPIES SERIES
Discharge: HOME | End: 2019-06-28
Payer: MEDICARE

## 2019-06-28 DIAGNOSIS — I48.91 ATRIAL FIBRILLATION, UNSPECIFIED TYPE (HCC): ICD-10-CM

## 2019-06-28 LAB — INTERNATIONAL NORMALIZATION RATIO, POC: 4.3

## 2019-06-28 PROCEDURE — 99211 OFF/OP EST MAY X REQ PHY/QHP: CPT

## 2019-06-28 PROCEDURE — 85610 PROTHROMBIN TIME: CPT

## 2019-06-28 NOTE — PROGRESS NOTES
Ms. Jordan Callahan is a 80 y.o. y/o female with history of Afib who presents today for anticoagulation monitoring and adjustment. INR 4.3 is supratherapeutic for this patient (goal range 2-3) and is reflective of 20 mg TWD  Patient verifies current dosing regimen, patient able to verbally recall dose  Patient reports no missed doses since last INR   Patient denies s/sx clotting and/or stroke  Patient denies hematuria, epistaxis, rectal bleeding  Patient denies changes in diet, alcohol, or tobacco use  Patient states she has experienced anxiety over her health lately.   Reviewed medication list and drug allergies with patient, updated any medication additions or modifications accordingly  Patient also denies any pending medical or dental procedures scheduled at this time  Patient was instructed to hold warfarin today, then reduce TWD of warfarin to 17.5mg and RTC in 2 weeks

## 2019-07-12 ENCOUNTER — HOSPITAL ENCOUNTER (OUTPATIENT)
Dept: PHARMACY | Age: 84
Setting detail: THERAPIES SERIES
Discharge: HOME | End: 2019-07-12
Payer: MEDICARE

## 2019-07-12 DIAGNOSIS — I48.91 ATRIAL FIBRILLATION, UNSPECIFIED TYPE (HCC): ICD-10-CM

## 2019-07-12 LAB — INTERNATIONAL NORMALIZATION RATIO, POC: 2.1

## 2019-07-12 PROCEDURE — 99211 OFF/OP EST MAY X REQ PHY/QHP: CPT

## 2019-07-12 PROCEDURE — 85610 PROTHROMBIN TIME: CPT

## 2019-08-05 RX ORDER — WARFARIN SODIUM 2.5 MG/1
TABLET ORAL
Qty: 120 TABLET | Refills: 1 | Status: SHIPPED | OUTPATIENT
Start: 2019-08-05 | End: 2019-11-27 | Stop reason: SDUPTHER

## 2019-08-07 ENCOUNTER — HOSPITAL ENCOUNTER (OUTPATIENT)
Dept: PHARMACY | Age: 84
Setting detail: THERAPIES SERIES
Discharge: HOME | End: 2019-08-07
Payer: MEDICARE

## 2019-08-07 DIAGNOSIS — I48.91 ATRIAL FIBRILLATION, UNSPECIFIED TYPE (HCC): ICD-10-CM

## 2019-08-07 PROCEDURE — 85610 PROTHROMBIN TIME: CPT

## 2019-08-07 PROCEDURE — 99211 OFF/OP EST MAY X REQ PHY/QHP: CPT

## 2019-09-11 ENCOUNTER — HOSPITAL ENCOUNTER (OUTPATIENT)
Dept: PHARMACY | Age: 84
Setting detail: THERAPIES SERIES
Discharge: HOME | End: 2019-09-11
Payer: MEDICARE

## 2019-09-11 DIAGNOSIS — I48.91 ATRIAL FIBRILLATION, UNSPECIFIED TYPE (HCC): ICD-10-CM

## 2019-09-11 LAB
INTERNATIONAL NORMALIZATION RATIO, POC: 3
INTERNATIONAL NORMALIZATION RATIO, POC: 3

## 2019-09-11 PROCEDURE — 99211 OFF/OP EST MAY X REQ PHY/QHP: CPT

## 2019-09-11 PROCEDURE — 85610 PROTHROMBIN TIME: CPT

## 2019-09-30 ENCOUNTER — HOSPITAL ENCOUNTER (OUTPATIENT)
Dept: LAB | Age: 84
Discharge: HOME | End: 2019-09-30
Payer: MEDICARE

## 2019-09-30 LAB
ALBUMIN SERPL-MCNC: 4.1 G/DL (ref 3.5–4.6)
ALP BLD-CCNC: 94 U/L (ref 40–130)
ALT SERPL-CCNC: 12 U/L (ref 0–33)
ANION GAP SERPL CALCULATED.3IONS-SCNC: 15 MEQ/L (ref 9–15)
AST SERPL-CCNC: 11 U/L (ref 0–35)
BILIRUB SERPL-MCNC: 0.7 MG/DL (ref 0.2–0.7)
BUN BLDV-MCNC: 17 MG/DL (ref 8–23)
CALCIUM SERPL-MCNC: 9.5 MG/DL (ref 8.5–9.9)
CHLORIDE BLD-SCNC: 101 MEQ/L (ref 95–107)
CO2: 26 MEQ/L (ref 20–31)
CREAT SERPL-MCNC: 0.86 MG/DL (ref 0.5–0.9)
GFR AFRICAN AMERICAN: >60
GFR NON-AFRICAN AMERICAN: >60
GLOBULIN: 3.3 G/DL (ref 2.3–3.5)
GLUCOSE BLD-MCNC: 96 MG/DL (ref 70–99)
HCT VFR BLD CALC: 42.3 % (ref 37–47)
HEMOGLOBIN: 13.7 G/DL (ref 12–16)
MCH RBC QN AUTO: 31.4 PG (ref 27–31.3)
MCHC RBC AUTO-ENTMCNC: 32.3 % (ref 33–37)
MCV RBC AUTO: 97.3 FL (ref 82–100)
PDW BLD-RTO: 14.8 % (ref 11.5–14.5)
PLATELET # BLD: 220 K/UL (ref 130–400)
POTASSIUM SERPL-SCNC: 4.5 MEQ/L (ref 3.4–4.9)
RBC # BLD: 4.34 M/UL (ref 4.2–5.4)
SODIUM BLD-SCNC: 142 MEQ/L (ref 135–144)
TOTAL PROTEIN: 7.4 G/DL (ref 6.3–8)
WBC # BLD: 5.8 K/UL (ref 4.8–10.8)

## 2019-09-30 PROCEDURE — 80053 COMPREHEN METABOLIC PANEL: CPT

## 2019-09-30 PROCEDURE — 85027 COMPLETE CBC AUTOMATED: CPT

## 2019-10-22 ENCOUNTER — HOSPITAL ENCOUNTER (OUTPATIENT)
Dept: PHARMACY | Age: 84
Setting detail: THERAPIES SERIES
Discharge: HOME | End: 2019-10-22
Payer: MEDICARE

## 2019-10-22 DIAGNOSIS — I48.91 ATRIAL FIBRILLATION, UNSPECIFIED TYPE (HCC): ICD-10-CM

## 2019-10-22 LAB — INTERNATIONAL NORMALIZATION RATIO, POC: 3

## 2019-10-22 PROCEDURE — 85610 PROTHROMBIN TIME: CPT | Performed by: PHARMACIST

## 2019-10-22 PROCEDURE — 99211 OFF/OP EST MAY X REQ PHY/QHP: CPT | Performed by: PHARMACIST

## 2019-11-04 ENCOUNTER — HOSPITAL ENCOUNTER (OUTPATIENT)
Dept: WOMENS IMAGING | Age: 84
Discharge: HOME | End: 2019-11-06
Payer: MEDICARE

## 2019-11-04 DIAGNOSIS — Z12.31 ENCOUNTER FOR SCREENING MAMMOGRAM FOR BREAST CANCER: ICD-10-CM

## 2019-11-04 PROCEDURE — 77063 BREAST TOMOSYNTHESIS BI: CPT

## 2019-11-27 RX ORDER — WARFARIN SODIUM 2.5 MG/1
TABLET ORAL
Qty: 120 TABLET | Refills: 1 | Status: SHIPPED | OUTPATIENT
Start: 2019-11-27 | End: 2019-12-04 | Stop reason: SDUPTHER

## 2019-12-03 ENCOUNTER — HOSPITAL ENCOUNTER (OUTPATIENT)
Dept: PHARMACY | Age: 84
Setting detail: THERAPIES SERIES
Discharge: HOME | End: 2019-12-03
Payer: MEDICARE

## 2019-12-03 DIAGNOSIS — I48.91 ATRIAL FIBRILLATION, UNSPECIFIED TYPE (HCC): ICD-10-CM

## 2019-12-03 LAB — INTERNATIONAL NORMALIZATION RATIO, POC: 4.1

## 2019-12-03 PROCEDURE — 85610 PROTHROMBIN TIME: CPT

## 2019-12-03 PROCEDURE — 99211 OFF/OP EST MAY X REQ PHY/QHP: CPT

## 2019-12-04 RX ORDER — WARFARIN SODIUM 2.5 MG/1
TABLET ORAL
Qty: 120 TABLET | Refills: 1 | Status: SHIPPED | OUTPATIENT
Start: 2019-12-04 | End: 2020-03-23 | Stop reason: SDUPTHER

## 2020-01-14 ENCOUNTER — HOSPITAL ENCOUNTER (OUTPATIENT)
Dept: PHARMACY | Age: 85
Setting detail: THERAPIES SERIES
Discharge: HOME | End: 2020-01-14
Payer: MEDICARE

## 2020-01-14 LAB — INTERNATIONAL NORMALIZATION RATIO, POC: 2.7

## 2020-01-14 PROCEDURE — 99211 OFF/OP EST MAY X REQ PHY/QHP: CPT

## 2020-01-14 PROCEDURE — 85610 PROTHROMBIN TIME: CPT

## 2020-01-14 NOTE — PROGRESS NOTES
Ms. Jaylin Morin is a 80 y.o. y/o female with history of Afib who presents today for anticoagulation monitoring and adjustment.   INR 2.7 is therapeutic for this patient (goal range 2-3) and is reflective of 20 mg TWD  Patient verifies current dosing regimen, patient able to verbally recall dose  Patient reports no  missed doses since last INR   Patient denies s/sx clotting and/or stroke  Patient denies hematuria, epistaxis, rectal bleeding  Patient denies changes in diet, alcohol, or tobacco use  Reviewed medication list and drug allergies with patient, updated any medication additions or modifications accordingly  Patient also denies any pending medical or dental procedures scheduled at this time  Patient was instructed to continue current regimen of 20 mg TWD and RTC 6 weeks    Andrés Fierro, PharmD, BCPS  Staff Pharmacist  1/14/2020 8:28 AM

## 2020-02-25 ENCOUNTER — HOSPITAL ENCOUNTER (OUTPATIENT)
Dept: PHARMACY | Age: 85
Setting detail: THERAPIES SERIES
Discharge: HOME | End: 2020-02-25
Payer: MEDICARE

## 2020-02-25 LAB — INTERNATIONAL NORMALIZATION RATIO, POC: 3.1

## 2020-02-25 PROCEDURE — 99211 OFF/OP EST MAY X REQ PHY/QHP: CPT

## 2020-02-25 PROCEDURE — 85610 PROTHROMBIN TIME: CPT

## 2020-03-09 ENCOUNTER — HOSPITAL ENCOUNTER (OUTPATIENT)
Dept: LAB | Age: 85
Discharge: HOME | End: 2020-03-09
Payer: MEDICARE

## 2020-03-09 LAB
ALBUMIN SERPL-MCNC: 4 G/DL (ref 3.5–4.6)
ALP BLD-CCNC: 91 U/L (ref 40–130)
ALT SERPL-CCNC: 12 U/L (ref 0–33)
ANION GAP SERPL CALCULATED.3IONS-SCNC: 14 MEQ/L (ref 9–15)
AST SERPL-CCNC: 12 U/L (ref 0–35)
BILIRUB SERPL-MCNC: 0.5 MG/DL (ref 0.2–0.7)
BUN BLDV-MCNC: 20 MG/DL (ref 8–23)
CALCIUM SERPL-MCNC: 9.4 MG/DL (ref 8.5–9.9)
CHLORIDE BLD-SCNC: 103 MEQ/L (ref 95–107)
CHOLESTEROL, TOTAL: 167 MG/DL (ref 0–199)
CO2: 25 MEQ/L (ref 20–31)
CREAT SERPL-MCNC: 0.91 MG/DL (ref 0.5–0.9)
GFR AFRICAN AMERICAN: >60
GFR NON-AFRICAN AMERICAN: 58.7
GLOBULIN: 3 G/DL (ref 2.3–3.5)
GLUCOSE BLD-MCNC: 90 MG/DL (ref 70–99)
HDLC SERPL-MCNC: 66 MG/DL (ref 40–59)
LDL CHOLESTEROL CALCULATED: 79 MG/DL (ref 0–129)
POTASSIUM SERPL-SCNC: 4.5 MEQ/L (ref 3.4–4.9)
SODIUM BLD-SCNC: 142 MEQ/L (ref 135–144)
TOTAL PROTEIN: 7 G/DL (ref 6.3–8)
TRIGL SERPL-MCNC: 109 MG/DL (ref 0–150)

## 2020-03-09 PROCEDURE — 80061 LIPID PANEL: CPT

## 2020-03-09 PROCEDURE — 80053 COMPREHEN METABOLIC PANEL: CPT

## 2020-03-09 PROCEDURE — 36415 COLL VENOUS BLD VENIPUNCTURE: CPT

## 2020-05-11 ENCOUNTER — TELEPHONE (OUTPATIENT)
Dept: PHARMACY | Age: 85
End: 2020-05-11

## 2020-05-12 ENCOUNTER — HOSPITAL ENCOUNTER (OUTPATIENT)
Dept: PHARMACY | Age: 85
Setting detail: THERAPIES SERIES
Discharge: HOME | End: 2020-05-12
Payer: MEDICARE

## 2020-05-12 DIAGNOSIS — I48.91 ATRIAL FIBRILLATION, UNSPECIFIED TYPE (HCC): ICD-10-CM

## 2020-05-12 LAB
INR BLD: 2.4
PROTHROMBIN TIME: 26.4 SEC (ref 12.3–14.9)

## 2020-05-12 PROCEDURE — 99211 OFF/OP EST MAY X REQ PHY/QHP: CPT | Performed by: PHARMACIST

## 2020-06-23 ENCOUNTER — HOSPITAL ENCOUNTER (OUTPATIENT)
Dept: PHARMACY | Age: 85
Setting detail: THERAPIES SERIES
Discharge: HOME | End: 2020-06-23
Payer: MEDICARE

## 2020-06-23 VITALS — TEMPERATURE: 97.2 F

## 2020-06-23 LAB — INTERNATIONAL NORMALIZATION RATIO, POC: 2.8

## 2020-06-23 PROCEDURE — 85610 PROTHROMBIN TIME: CPT | Performed by: PHARMACIST

## 2020-06-23 PROCEDURE — 99211 OFF/OP EST MAY X REQ PHY/QHP: CPT | Performed by: PHARMACIST

## 2020-08-04 ENCOUNTER — HOSPITAL ENCOUNTER (OUTPATIENT)
Dept: PHARMACY | Age: 85
Setting detail: THERAPIES SERIES
Discharge: HOME | End: 2020-08-04
Payer: MEDICARE

## 2020-08-04 LAB — INTERNATIONAL NORMALIZATION RATIO, POC: 2.5

## 2020-08-04 PROCEDURE — 85610 PROTHROMBIN TIME: CPT

## 2020-08-04 PROCEDURE — 99211 OFF/OP EST MAY X REQ PHY/QHP: CPT

## 2020-08-04 NOTE — PROGRESS NOTES
Ms. Dayna Rosales is a 80 y.o. y/o female with history of Afib who presents today for anticoagulation monitoring and adjustment.   INR 2.5 is therapeutic for this patient (goal range 2-3) and is reflective of 20 mg TWD  Patient verifies current dosing regimen, patient able to verbally recall dose  Patient reports no  missed doses since last INR   Patient denies s/sx clotting and/or stroke  Patient denies hematuria, epistaxis, rectal bleeding  Patient denies changes in diet, alcohol, or tobacco use  Reviewed medication list and drug allergies with patient, updated any medication additions or modifications accordingly  Patient also denies any pending medical or dental procedures scheduled at this time  Patient was instructed to continue current regimen of 20 mg TWD and RTC 6 weeks    CLINICAL PHARMACY CONSULT: MED RECONCILIATION/REVIEW [address]: No  Total # of Interventions Recommended: 1  - Maintenance Safety Lab Monitoring #: 1  Total Interventions Accepted: 1  Time Spent (min): 30    Ricardo Holcomb, PharmD, [id number]  Staff Pharmacist  8/4/2020 9:11 AM

## 2020-08-31 ENCOUNTER — HOSPITAL ENCOUNTER (OUTPATIENT)
Dept: LAB | Age: 85
Discharge: HOME | End: 2020-08-31
Payer: MEDICARE

## 2020-09-15 ENCOUNTER — HOSPITAL ENCOUNTER (OUTPATIENT)
Dept: PHARMACY | Age: 85
Setting detail: THERAPIES SERIES
Discharge: HOME | End: 2020-09-15
Payer: MEDICARE

## 2020-09-15 VITALS — TEMPERATURE: 97.6 F

## 2020-09-15 LAB — INTERNATIONAL NORMALIZATION RATIO, POC: 3.7

## 2020-09-15 PROCEDURE — 85610 PROTHROMBIN TIME: CPT

## 2020-09-15 PROCEDURE — 99211 OFF/OP EST MAY X REQ PHY/QHP: CPT

## 2020-09-15 NOTE — PROGRESS NOTES
Ms. Stanford Friedman is a 80 y.o. y/o female with history of Afib who presents today for anticoagulation monitoring and adjustment. INR 3.7 is supratherapeutic for this patient (goal range 2-3) and is reflective of 20 mg TWD  Patient verifies current dosing regimen, patient able to verbally recall dose  Patient reports 0 missed doses since last INR   Patient denies s/sx clotting and/or stroke  Patient denies hematuria, epistaxis, rectal bleeding  Patient denies changes in alcohol, or tobacco use. Patient states may be eating less vit K foods, plans to return to normal diet  Reviewed medication list and drug allergies with patient, updated any medication additions or modifications accordingly  Patient also denies any pending medical or dental procedures scheduled at this time  Patient was instructed to take 1 tablet tomorrow then resume normal schedule (20mg TWD) and RTC 4 weeks      CLINICAL PHARMACY CONSULT: MED RECONCILIATION/REVIEW [address]: No  Total # of Interventions Recommended: 2  - Decreased Dose #: 1  - Maintenance Safety Lab Monitoring #: 1  Total Interventions Accepted: 2  Time Spent (min): 30    Jesus Iraheta PharmD.   9/15/2020

## 2020-09-16 RX ORDER — WARFARIN SODIUM 2.5 MG/1
TABLET ORAL
Qty: 120 TABLET | Refills: 1 | Status: SHIPPED | OUTPATIENT
Start: 2020-09-16 | End: 2020-10-13 | Stop reason: SDUPTHER

## 2020-10-13 ENCOUNTER — HOSPITAL ENCOUNTER (OUTPATIENT)
Dept: PHARMACY | Age: 85
Setting detail: THERAPIES SERIES
Discharge: HOME | End: 2020-10-13
Payer: MEDICARE

## 2020-10-13 VITALS — TEMPERATURE: 97.3 F

## 2020-10-13 LAB — INTERNATIONAL NORMALIZATION RATIO, POC: 3.3

## 2020-10-13 PROCEDURE — 99211 OFF/OP EST MAY X REQ PHY/QHP: CPT | Performed by: PHARMACIST

## 2020-10-13 PROCEDURE — 85610 PROTHROMBIN TIME: CPT | Performed by: PHARMACIST

## 2020-10-13 RX ORDER — WARFARIN SODIUM 2.5 MG/1
TABLET ORAL
Qty: 120 TABLET | Refills: 1 | Status: SHIPPED | OUTPATIENT
Start: 2020-10-13 | End: 2021-09-03 | Stop reason: SDUPTHER

## 2020-10-13 NOTE — PROGRESS NOTES
Ms. Misael Rose is a 80 y.o. y/o female with history of Afib who presents today for anticoagulation monitoring and adjustment. INR 3.3 is sl supratherapeutic for this patient (goal range 2-3) and is reflective of 20 mg TWD  Patient verifies current dosing regimen, patient able to verbally recall dose  Patient reports no  missed doses since last INR   Patient denies s/sx clotting and/or stroke  Patient denies hematuria, epistaxis, rectal bleeding  Patient denies changes in diet, alcohol, or tobacco use  Reviewed medication list and drug allergies with patient, updated any medication additions or modifications accordingly  Patient also denies any pending medical or dental procedures scheduled at this time  Patient was instructed to decrease TWD to 18.75mg and RTC 4 weeks      CLINICAL PHARMACY CONSULT: MED RECONCILIATION/REVIEW [address]: No  Total # of Interventions Recommended: 1  - Decreased Dose #: 1  - Maintenance Safety Lab Monitoring #: 1  Total Interventions Accepted: 1  Time Spent (min): 15    JONAH Flores Ph. 10/13/2020 10:27 AM

## 2020-11-10 ENCOUNTER — HOSPITAL ENCOUNTER (OUTPATIENT)
Dept: PHARMACY | Age: 85
Setting detail: THERAPIES SERIES
Discharge: HOME | End: 2020-11-10
Payer: MEDICARE

## 2020-11-10 VITALS — TEMPERATURE: 97.5 F

## 2020-11-10 LAB — INTERNATIONAL NORMALIZATION RATIO, POC: 2.7

## 2020-11-10 PROCEDURE — 85610 PROTHROMBIN TIME: CPT

## 2020-11-10 PROCEDURE — 99211 OFF/OP EST MAY X REQ PHY/QHP: CPT

## 2020-11-10 NOTE — PROGRESS NOTES
Ms. Caterina Win is a 80 y.o. y/o female with history of Afib who presents today for anticoagulation monitoring and adjustment. Today's INR at 2.7 is therapeutic for this patient (goal range 2 - 3) and is reflective of 18.75 mg TWD  Patient verifies current dosing regimen, patient able to verbally recall dose  Patient reports ZERO  missed doses since last INR   Patient denies s/sx clotting and/or stroke  Patient denies hematuria, epistaxis, rectal bleeding  Patient denies changes in diet, alcohol, or tobacco use  Reviewed medication list and drug allergies with patient, updated any medication additions or modifications accordingly  Patient also denies any pending medical or dental procedures scheduled at this time  Patient was instructed to continue current regimen of 18.75 mg TWD and RTC in 4 weeks    Takes Tylenol 650 mg occasionally ~ 2 tablets per week.        CLINICAL PHARMACY CONSULT: MED RECONCILIATION/REVIEW ADDENDUM    For Pharmacy Admin Tracking Only    PHSO: No  Total # of Interventions Recommended: 1  - Maintenance Safety Lab Monitoring #: 1  Total Interventions Accepted: 1  Time Spent (min): [address]

## 2020-11-20 ENCOUNTER — HOSPITAL ENCOUNTER (OUTPATIENT)
Dept: WOMENS IMAGING | Age: 85
Discharge: HOME | End: 2020-11-22
Payer: MEDICARE

## 2020-11-20 PROCEDURE — 77063 BREAST TOMOSYNTHESIS BI: CPT

## 2020-12-08 ENCOUNTER — TELEPHONE (OUTPATIENT)
Dept: PHARMACY | Age: 85
End: 2020-12-08

## 2020-12-14 ENCOUNTER — HOSPITAL ENCOUNTER (OUTPATIENT)
Dept: PHARMACY | Age: 85
Setting detail: THERAPIES SERIES
Discharge: HOME | End: 2020-12-14
Payer: MEDICARE

## 2020-12-14 VITALS — TEMPERATURE: 97.5 F

## 2020-12-14 LAB — INTERNATIONAL NORMALIZATION RATIO, POC: 2.8

## 2020-12-14 PROCEDURE — 85610 PROTHROMBIN TIME: CPT

## 2020-12-14 PROCEDURE — 99211 OFF/OP EST MAY X REQ PHY/QHP: CPT

## 2020-12-14 NOTE — PROGRESS NOTES
Ms. Norbert Esquivel is a 80 y.o. y/o female with history of Afib who presents today for anticoagulation monitoring and adjustment.   INR 2.8 is therapeutic for this patient (goal range 2-3) and is reflective of 18.75 mg TWD  Patient verifies current dosing regimen, patient able to verbally recall dose  Patient reports zero missed doses since last INR   Patient denies s/sx clotting and/or stroke  Patient denies hematuria, epistaxis, rectal bleeding  Patient denies changes in diet, alcohol, or tobacco use  Reviewed medication list and drug allergies with patient, updated any medication additions or modifications accordingly  Patient also denies any pending medical or dental procedures scheduled at this time  Patient was instructed to continue current regimen of warfarin 18.75 mg TWD and RTC 6 weeks    CLINICAL PHARMACY CONSULT: MED RECONCILIATION/REVIEW [address]: No  Total # of Interventions Recommended: 1  - Maintenance Safety Lab Monitoring #: 1  Total Interventions Accepted: 1  Time Spent (min): [address] Cedric Marshall, PharmD  [address]

## 2021-01-25 ENCOUNTER — HOSPITAL ENCOUNTER (OUTPATIENT)
Dept: PHARMACY | Age: 86
Setting detail: THERAPIES SERIES
Discharge: HOME | End: 2021-01-25
Payer: MEDICARE

## 2021-01-25 VITALS — TEMPERATURE: 97.4 F

## 2021-01-25 DIAGNOSIS — I48.91 ATRIAL FIBRILLATION, UNSPECIFIED TYPE (HCC): ICD-10-CM

## 2021-01-25 LAB — INTERNATIONAL NORMALIZATION RATIO, POC: 3.4

## 2021-01-25 PROCEDURE — 85610 PROTHROMBIN TIME: CPT | Performed by: PHARMACIST

## 2021-01-25 PROCEDURE — 99211 OFF/OP EST MAY X REQ PHY/QHP: CPT | Performed by: PHARMACIST

## 2021-01-25 NOTE — PROGRESS NOTES
Ms. Cony Kinney is a 80 y.o. y/o female with history of Afib who presents today for anticoagulation monitoring and adjustment. INR 3.4 is supratherapeutic for this patient (goal range 2-3) and is reflective of 18.75 mg TWD  Patient verifies current dosing regimen, patient able to verbally recall dose  Patient reports no missed doses since last INR   Patient denies s/sx clotting and/or stroke  Patient denies hematuria, epistaxis, rectal bleeding  Patient denies changes in diet, alcohol, or tobacco use  Reviewed medication list and drug allergies with patient, updated any medication additions or modifications accordingly  Patient also denies any pending medical or dental procedures scheduled at this time  Patient reports eating less green vegetables than usual.   Patient reports taking more tylenol than before, about one tablet per day. Patient was instructed to take a reduced dose of 1.25 mg today, then resume 18.75 mg TWD and RTC 4 weeks    CLINICAL PHARMACY CONSULT: MED RECONCILIATION/REVIEW [address]: No  Total # of Interventions Recommended: 1  - Decreased Dose #: 1  - Maintenance Safety Lab Monitoring #: 1  Total Interventions Accepted: 1  Time Spent (min): 30    Christofer Clark R. Ph. 1/25/2021 9:46 AM

## 2021-02-22 ENCOUNTER — TELEPHONE (OUTPATIENT)
Dept: PHARMACY | Age: 86
End: 2021-02-22

## 2021-02-22 DIAGNOSIS — I48.91 ATRIAL FIBRILLATION, UNSPECIFIED TYPE (HCC): ICD-10-CM

## 2021-02-23 ENCOUNTER — HOSPITAL ENCOUNTER (OUTPATIENT)
Dept: PHARMACY | Age: 86
Setting detail: THERAPIES SERIES
Discharge: HOME | End: 2021-02-23
Payer: MEDICARE

## 2021-02-23 VITALS — TEMPERATURE: 97.1 F

## 2021-02-23 DIAGNOSIS — I48.91 ATRIAL FIBRILLATION, UNSPECIFIED TYPE (HCC): ICD-10-CM

## 2021-02-23 LAB — INTERNATIONAL NORMALIZATION RATIO, POC: 3.5

## 2021-02-23 PROCEDURE — 85610 PROTHROMBIN TIME: CPT

## 2021-02-23 PROCEDURE — 99211 OFF/OP EST MAY X REQ PHY/QHP: CPT

## 2021-02-23 NOTE — PROGRESS NOTES
Ms. Inna Silveira is a 80 y.o. y/o female with history of Afib who presents today for anticoagulation monitoring and adjustment.   INR 3.5 is supra therapeutic for this patient (goal range 2-3   and is reflective of 18.75 mg TWD  Patient verifies current dosing regimen, patient able to verbally recall dose  Patient reports 0  missed doses since last INR   Patient denies s/sx clotting and/or stroke  Patient denies hematuria, epistaxis, rectal bleeding  Patient denies changes in diet, alcohol, or tobacco use  Reviewed medication list and drug allergies with patient, updated any medication additions or modifications accordingly  Patient also denies any pending medical or dental procedures scheduled at this time  Patient has been undergoing stress with health and medical appointments of   INR has been increasing at 18.75 mg TWD from 11/10/20   Patient was instructed to decrease to 17.5 mg TWD (6.7 %)  and  RTC 2 weeks, patient unable to come due to schedule for 4 weeks    CLINICAL PHARMACY CONSULT: MED RECONCILIATION/REVIEW [address]: No  Total # of Interventions Recommended: 2  - Decreased Dose #: 1  - Maintenance Safety Lab Monitoring #: 1    Total Interventions Accepted: 2  Time Spent (min): [address] [facility], [facility]

## 2021-03-24 ENCOUNTER — TELEPHONE (OUTPATIENT)
Dept: PHARMACY | Age: 86
End: 2021-03-24

## 2021-03-24 DIAGNOSIS — I48.91 ATRIAL FIBRILLATION, UNSPECIFIED TYPE (HCC): ICD-10-CM

## 2021-03-25 ENCOUNTER — HOSPITAL ENCOUNTER (OUTPATIENT)
Dept: PHARMACY | Age: 86
Setting detail: THERAPIES SERIES
Discharge: HOME | End: 2021-03-25
Payer: MEDICARE

## 2021-03-25 VITALS — TEMPERATURE: 97.2 F

## 2021-03-25 DIAGNOSIS — I48.91 ATRIAL FIBRILLATION, UNSPECIFIED TYPE (HCC): ICD-10-CM

## 2021-03-25 LAB — INTERNATIONAL NORMALIZATION RATIO, POC: 5.7

## 2021-03-25 PROCEDURE — 99211 OFF/OP EST MAY X REQ PHY/QHP: CPT

## 2021-03-25 PROCEDURE — 85610 PROTHROMBIN TIME: CPT

## 2021-04-01 ENCOUNTER — HOSPITAL ENCOUNTER (OUTPATIENT)
Dept: PHARMACY | Age: 86
Setting detail: THERAPIES SERIES
Discharge: HOME | End: 2021-04-01
Payer: MEDICARE

## 2021-04-01 VITALS — TEMPERATURE: 97.2 F

## 2021-04-01 DIAGNOSIS — I48.91 ATRIAL FIBRILLATION, UNSPECIFIED TYPE (HCC): ICD-10-CM

## 2021-04-01 LAB — INTERNATIONAL NORMALIZATION RATIO, POC: 2.9

## 2021-04-01 PROCEDURE — 99211 OFF/OP EST MAY X REQ PHY/QHP: CPT

## 2021-04-01 PROCEDURE — 85610 PROTHROMBIN TIME: CPT

## 2021-04-01 NOTE — PROGRESS NOTES
Ms. Dale Baer is a 80 y.o. y/o female with history of Afib who presents today for anticoagulation monitoring and adjustment. Due to COVID protocol, extensive cleaning with Virex performed before and after visit. Temperature assessed. Masks worn by both parties the entire visit. Faceshield/googles worn by me. INR 2.9 is therapeutic for this patient (goal range 2-3) and is reflective of 12.5 mg TWD due to supra therapeutic INR  Patient verifies current dosing regimen, patient able to verbally recall dose  Patient reports 2  missed doses since last INR due to critical INR  Patient denies s/sx clotting and/or stroke  Patient denies hematuria, epistaxis, rectal bleeding  Patient denies changes in diet, alcohol, or tobacco use  Reviewed medication list and drug allergies with patient, updated any medication additions or modifications accordingly  Patient also denies any pending medical or dental procedures scheduled at this time  Patient was instructed to start reduced dose of  15 mg TWD and RTC 2 weeks  Pt prefers 4 weeks due to transportation issues.  Understands risks  Pt recently lost     CLINICAL PHARMACY CONSULT: MED RECONCILIATION/REVIEW Ruth  22. Tracking Only    PHSO: No  Total # of Interventions Recommended: 0  Total Interventions Accepted: 0  Time Spent (min): [address]  Staff Pharmacist, [facility]  4/1/2021  10:46 AM

## 2021-04-29 ENCOUNTER — HOSPITAL ENCOUNTER (OUTPATIENT)
Dept: PHARMACY | Age: 86
Setting detail: THERAPIES SERIES
Discharge: HOME | End: 2021-04-29
Payer: MEDICARE

## 2021-04-29 DIAGNOSIS — I48.91 ATRIAL FIBRILLATION, UNSPECIFIED TYPE (HCC): ICD-10-CM

## 2021-04-29 LAB — INTERNATIONAL NORMALIZATION RATIO, POC: 3.1

## 2021-04-29 PROCEDURE — 99211 OFF/OP EST MAY X REQ PHY/QHP: CPT

## 2021-04-29 PROCEDURE — 85610 PROTHROMBIN TIME: CPT

## 2021-04-29 NOTE — PROGRESS NOTES
Ms. Daniel Rios is a 80 y.o. y/o female with history of Afib who presents today for anticoagulation monitoring and adjustment. INR 3.1 is sl supratherapeutic for this patient (goal range 2-3) and is reflective of 17.5 mg TWD  Patient verifies current dosing regimen, patient able to verbally recall dose  Patient reports no  missed doses since last INR   Patient denies s/sx clotting and/or stroke  Patient denies hematuria, epistaxis, rectal bleeding  Patient denies changes in diet, alcohol, or tobacco use  Reviewed medication list and drug allergies with patient, updated any medication additions or modifications accordingly    Patient did not understand instructions at last visit, only skipped warfarin on the Thursday she was in the office then took 1 tab every day.  Has been eating better and 3-4 salads per week    Patient also denies any pending medical or dental procedures scheduled at this time  Patient was instructed to continue 17.5 mg TWD and RTC 4 weeks, pt requested 6 weeks however due to high INRs recently and  passing away I did not feel comfortable scheduling for 6 weeks at this time    For [address] [place] [address] Intervention Detail: Adherence Monitorin and Lab(s) Ordered   Total # of Interventions Recommended: 2   Total # of Interventions Accepted: 2   Time Spent (min): 30    Karthik Santana, PharmD, [facility]  Staff Pharmacist  2021 10:31 AM

## 2021-05-20 LAB
ABO: NORMAL
ANTIBODY SCREEN: NORMAL
FRESH FROZEN PLASMA: NORMAL
INR BLD: 1.6 (ref 0.9–1.1)
INR BLD: 2.8 (ref 0.9–1.1)
PROTHROMBIN TIME: 18.7 SEC (ref 10.1–13)
PROTHROMBIN TIME: 32.6 SEC (ref 10.1–13)
RH TYPE: NORMAL

## 2021-05-21 LAB
BASOPHILS # BLD: 0.01 X10E9/L (ref 0–0.1)
BASOPHILS RELATIVE PERCENT: 0.1 % (ref 0–2)
ERYTHROCYTE [DISTWIDTH] IN BLOOD BY AUTOMATED COUNT: 14.6 % (ref 11.5–14)
HCT VFR BLD CALC: 30.8 % (ref 36–46)
HEMOGLOBIN: 9.7 G/DL (ref 12–16)
IMMATURE GRANULOCYTES %: 0.3 % (ref 0–0.9)
INR BLD: 1.2 (ref 0.9–1.1)
LYMPHOCYTES # BLD: 4.5 % (ref 13–44)
LYMPHOCYTES RELATIVE PERCENT: 0.52 X10E9/L (ref 0.8–3)
MCHC RBC AUTO-ENTMCNC: 31.5 G/DL (ref 32–36)
MCV RBC AUTO: 101 FL (ref 80–100)
MONOCYTES # BLD: 1 X10E9/L (ref 0.05–0.8)
MONOCYTES RELATIVE PERCENT: 8.6 % (ref 2–10)
NEUTROPHILS RELATIVE PERCENT: 86.5 % (ref 40–80)
NEUTROPHILS: 10.08 X10E9/L (ref 1.6–5.5)
PLATELET # BLD: 174 X10E9/L (ref 150–450)
PROTHROMBIN TIME: 13.6 SEC (ref 10.1–13)
RBC # BLD: 3.06 X10E12/L (ref 4–5.2)
WBC: 11.7 X10E9/L (ref 4.4–11.3)

## 2021-05-22 LAB
ANION GAP SERPL CALCULATED.3IONS-SCNC: 13 MMOL/L (ref 10–20)
APPEARANCE: CLEAR
BASOPHILS # BLD: 0.02 X10E9/L (ref 0–0.1)
BASOPHILS RELATIVE PERCENT: 0.2 % (ref 0–2)
BICARBONATE: 24 MMOL/L (ref 21–32)
BILIRUBIN, URINE: NEGATIVE
BLOOD, URINE: NEGATIVE
CALCIUM SERPL-MCNC: 8.8 MG/DL (ref 8.6–10.3)
CHLORIDE BLD-SCNC: 105 MMOL/L (ref 98–107)
COLOR, URINE: YELLOW
CREAT SERPL-MCNC: 1.02 MG/DL (ref 0.5–1)
EOSINOPHIL # BLD: 0.01 X10E9/L (ref 0–0.4)
EOSINOPHILS RELATIVE PERCENT: 0.1 % (ref 0–6)
ERYTHROCYTE [DISTWIDTH] IN BLOOD BY AUTOMATED COUNT: 14.8 % (ref 11.5–14)
GFR AFRICAN AMERICAN: 62 ML/MIN/1.73M2
GFR NON-AFRICAN AMERICAN: 51 ML/MIN/1.73M2
GLUCOSE, URINE: NEGATIVE MG/DL
GLUCOSE: 114 MG/DL (ref 74–99)
HCT VFR BLD CALC: 28.1 % (ref 36–46)
HEMOGLOBIN: 8.9 G/DL (ref 12–16)
IMMATURE GRANULOCYTES %: 0.4 % (ref 0–0.9)
KETONES, URINE: NEGATIVE MG/DL
LEUKOCYTE ESTERASE, URINE: NEGATIVE
LYMPHOCYTES # BLD: 7.7 % (ref 13–44)
LYMPHOCYTES RELATIVE PERCENT: 0.82 X10E9/L (ref 0.8–3)
MCHC RBC AUTO-ENTMCNC: 31.7 G/DL (ref 32–36)
MCV RBC AUTO: 100 FL (ref 80–100)
MONOCYTES # BLD: 1.33 X10E9/L (ref 0.05–0.8)
MONOCYTES RELATIVE PERCENT: 12.5 % (ref 2–10)
NEUTROPHILS RELATIVE PERCENT: 79.1 % (ref 40–80)
NEUTROPHILS: 8.41 X10E9/L (ref 1.6–5.5)
NITRITE, URINE: NEGATIVE
PH UA: 5 (ref 5–8)
PLATELET # BLD: 152 X10E9/L (ref 150–450)
POTASSIUM SERPL-SCNC: 4.4 MMOL/L (ref 3.5–5.3)
PROTEIN UA: NEGATIVE MG/DL
RBC # BLD: 2.81 X10E12/L (ref 4–5.2)
SODIUM BLD-SCNC: 138 MMOL/L (ref 136–145)
SPECIFIC GRAVITY, URINE: 1.01 (ref 1–1)
UREA NITROGEN: 29 MG/DL (ref 6–23)
UROBILINOGEN, URINE: <2 MG/DL (ref 0–1.9)
WBC: 10.6 X10E9/L (ref 4.4–11.3)

## 2021-05-25 ENCOUNTER — TELEPHONE (OUTPATIENT)
Dept: PHARMACY | Age: 86
End: 2021-05-25

## 2021-05-25 DIAGNOSIS — I48.91 ATRIAL FIBRILLATION, UNSPECIFIED TYPE (HCC): Primary | ICD-10-CM

## 2021-05-25 NOTE — TELEPHONE ENCOUNTER
Patient's daughter-in-law called stating patient had a fall and is currently in rehab facility. Expected discharge 2 weeks. Reset tracker for 2 weeks to check on patient.

## 2021-06-08 ENCOUNTER — TELEPHONE (OUTPATIENT)
Dept: PHARMACY | Age: 86
End: 2021-06-08

## 2021-06-08 DIAGNOSIS — I48.91 ATRIAL FIBRILLATION, UNSPECIFIED TYPE (HCC): Primary | ICD-10-CM

## 2021-06-08 NOTE — TELEPHONE ENCOUNTER
No answer on patient phone and no voicemail available at this time when called to check on patient status     Reset POC INR tracker for Friday, when another attempt to check patient status should be made     Brenda Hodge, PharmD   6/8/2021 1:24 PM

## 2021-06-11 ENCOUNTER — TELEPHONE (OUTPATIENT)
Dept: PHARMACY | Age: 86
End: 2021-06-11

## 2021-06-11 DIAGNOSIS — I48.91 ATRIAL FIBRILLATION, UNSPECIFIED TYPE (HCC): Primary | ICD-10-CM

## 2021-06-14 ENCOUNTER — HOSPITAL ENCOUNTER (OUTPATIENT)
Dept: PHARMACY | Age: 86
Setting detail: THERAPIES SERIES
Discharge: HOME | End: 2021-06-14
Payer: MEDICARE

## 2021-06-14 DIAGNOSIS — I48.91 ATRIAL FIBRILLATION, UNSPECIFIED TYPE (HCC): Primary | ICD-10-CM

## 2021-06-14 LAB — INTERNATIONAL NORMALIZATION RATIO, POC: 5.2

## 2021-06-14 PROCEDURE — 99211 OFF/OP EST MAY X REQ PHY/QHP: CPT | Performed by: PHARMACIST

## 2021-06-14 PROCEDURE — 85610 PROTHROMBIN TIME: CPT | Performed by: PHARMACIST

## 2021-06-24 ENCOUNTER — HOSPITAL ENCOUNTER (OUTPATIENT)
Dept: PHARMACY | Age: 86
Setting detail: THERAPIES SERIES
Discharge: HOME | End: 2021-06-24
Payer: MEDICARE

## 2021-06-24 DIAGNOSIS — I48.91 ATRIAL FIBRILLATION, UNSPECIFIED TYPE (HCC): Primary | ICD-10-CM

## 2021-06-24 LAB — INTERNATIONAL NORMALIZATION RATIO, POC: 2.7

## 2021-06-24 PROCEDURE — 85610 PROTHROMBIN TIME: CPT

## 2021-06-24 PROCEDURE — 99211 OFF/OP EST MAY X REQ PHY/QHP: CPT

## 2021-06-24 NOTE — PROGRESS NOTES
Ms. Yemi Bennett is a 80 y.o. y/o female with history of Afib who presents today for anticoagulation monitoring and adjustment.   INR 2.7 is therapeutic for this patient (goal range 2-3) and is reflective of 17.5 mg TWD  Patient verifies current dosing regimen, patient able to verbally recall dose  Patient reports zero missed doses since last INR   Patient denies s/sx clotting and/or stroke  Patient denies hematuria, epistaxis, rectal bleeding  Patient denies changes in diet, alcohol, or tobacco use  Reviewed medication list and drug allergies with patient, updated any medication additions or modifications accordingly  Patient also denies any pending medical or dental procedures scheduled at this time  Patient was instructed to continue current dosing regimen of warfarin 17.5 mg TWD and RTC 3 weeks (patient said transportation can be difficult for her)    For Pharmacy Admin Tracking Only     Intervention Detail: Adherence Monitorin   Total # of Interventions Recommended: 1   Total # of Interventions Accepted: 1   Time Spent (min): [address], PharmD   2021 9:53 AM

## 2021-07-15 ENCOUNTER — HOSPITAL ENCOUNTER (OUTPATIENT)
Dept: PHARMACY | Age: 86
Setting detail: THERAPIES SERIES
Discharge: HOME | End: 2021-07-15
Payer: MEDICARE

## 2021-07-15 DIAGNOSIS — I48.0 PAROXYSMAL ATRIAL FIBRILLATION (HCC): Primary | ICD-10-CM

## 2021-07-15 LAB — INTERNATIONAL NORMALIZATION RATIO, POC: 4.8

## 2021-07-15 PROCEDURE — 99211 OFF/OP EST MAY X REQ PHY/QHP: CPT

## 2021-07-15 PROCEDURE — 85610 PROTHROMBIN TIME: CPT

## 2021-07-15 NOTE — PROGRESS NOTES
Ms. Libby Thompson is a 80 y.o. y/o female with history of Afib who presents today for anticoagulation monitoring and adjustment. INR 4.8 is supra therapeutic for this patient (goal range 2-3) and is reflective of 17.5 mg TWD  Patient verifies current dosing regimen, patient able to verbally recall dose  Patient reports 0  missed doses since last INR   Patient denies s/sx clotting and/or stroke  Patient denies hematuria, epistaxis, rectal bleeding  Patient denies changes in diet, alcohol, or tobacco use  Reviewed medication list and drug allergies with patient, updated any medication additions or modifications accordingly  Patient states taking Tylenol for back and arm pain  Patient also denies any pending medical or dental procedures scheduled at this time  Patient states under much stress after her  passed, had fall, in rehab, then stayed with son.  Patient is now home with somewhat more consistent schedule  Patient was instructed to hold warfarin today and tomorrow, then decrease to 16.25 mg TWD (7.1%) and RTC 1 week, patient unable to make appointment due to transportation, advised to call clinic to make appointment earlier if able    For Pharmacy Admin Tracking Only     Intervention Detail: Dose Adjustment: 2, reason: Therapy Optimization   Total # of Interventions Recommended: 2   Total # of Interventions Accepted: 2   Time Spent (min): 20

## 2021-08-05 ENCOUNTER — HOSPITAL ENCOUNTER (OUTPATIENT)
Dept: PHARMACY | Age: 86
Setting detail: THERAPIES SERIES
Discharge: HOME | End: 2021-08-05
Payer: MEDICARE

## 2021-08-05 LAB — INTERNATIONAL NORMALIZATION RATIO, POC: 2.2

## 2021-08-05 PROCEDURE — 85610 PROTHROMBIN TIME: CPT

## 2021-08-05 PROCEDURE — 99211 OFF/OP EST MAY X REQ PHY/QHP: CPT

## 2021-08-05 NOTE — PROGRESS NOTES
Ms. Michaela Clark is a 80 y.o. y/o female with history of Afib who presents today for anticoagulation monitoring and adjustment.   INR 2.2 is therapeutic for this patient (goal range 2-3) and is reflective of 16.25 mg TWD  Patient verifies current dosing regimen, patient able to verbally recall dose  Patient reports zero missed doses since last INR   Patient denies s/sx clotting and/or stroke  Patient denies hematuria, epistaxis, rectal bleeding  Patient denies changes in diet, alcohol, or tobacco use  Reviewed medication list and drug allergies with patient, updated any medication additions or modifications accordingly  Patient also denies any pending medical or dental procedures scheduled at this time  Patient was instructed to continue current dosing regimen of warfarin 16.25 mg and RTC 4 weeks (last time patient RTC 3 weeks, and she said transportation is an issue)    For Pharmacy Admin Tracking Only     Intervention Detail: Adherence Monitorin   Total # of Interventions Recommended: 1   Total # of Interventions Accepted: 1   Time Spent (min): [address], PharmD   2021 10:07 AM

## 2021-09-02 ENCOUNTER — HOSPITAL ENCOUNTER (OUTPATIENT)
Dept: PHARMACY | Age: 86
Setting detail: THERAPIES SERIES
Discharge: HOME | End: 2021-09-02
Payer: MEDICARE

## 2021-09-02 DIAGNOSIS — I48.91 ATRIAL FIBRILLATION, UNSPECIFIED TYPE (HCC): Primary | ICD-10-CM

## 2021-09-02 LAB — INTERNATIONAL NORMALIZATION RATIO, POC: 3.8

## 2021-09-02 PROCEDURE — 85610 PROTHROMBIN TIME: CPT | Performed by: PHARMACIST

## 2021-09-02 PROCEDURE — 99211 OFF/OP EST MAY X REQ PHY/QHP: CPT | Performed by: PHARMACIST

## 2021-09-03 RX ORDER — WARFARIN SODIUM 2.5 MG/1
TABLET ORAL
Qty: 100 TABLET | Refills: 1 | Status: SHIPPED | OUTPATIENT
Start: 2021-09-03 | End: 2022-08-18 | Stop reason: SDUPTHER

## 2021-09-30 ENCOUNTER — HOSPITAL ENCOUNTER (OUTPATIENT)
Dept: PHARMACY | Age: 86
Setting detail: THERAPIES SERIES
Discharge: HOME | End: 2021-09-30
Payer: MEDICARE

## 2021-09-30 LAB — INTERNATIONAL NORMALIZATION RATIO, POC: 2.1

## 2021-09-30 PROCEDURE — 99211 OFF/OP EST MAY X REQ PHY/QHP: CPT

## 2021-09-30 PROCEDURE — 85610 PROTHROMBIN TIME: CPT

## 2021-09-30 NOTE — PROGRESS NOTES
Ms. Nya Mejia is a 80 y.o. y/o female with history of Afib who presents today for anticoagulation monitoring and adjustment.   INR 2.1 is therapeutic for this patient (goal range 2-3) and is reflective of 15 mg TWD  Patient verifies current dosing regimen, patient able to verbally recall dose  Patient reports 0 missed doses since last INR   Patient denies s/sx clotting and/or stroke  Patient denies hematuria, epistaxis, rectal bleeding  Patient denies changes in diet, alcohol, or tobacco use  Patient has been eating less salads in the last few weeks  Reviewed medication list and drug allergies with patient, updated any medication additions or modifications accordingly  Patient also denies any pending medical or dental procedures scheduled at this time  Patient was instructed to continue 15 mg TWD and RTC 4 weeks - patient prefers 4 weeks or longer due to transportation issues    For Pharmacy Admin Tracking Only     Intervention Detail: Lab(s) Ordered   Total # of Interventions Recommended: 1   Total # of Interventions Accepted: 1   Time Spent (min): 30    Emy Fuchs PharmD  PGY-1 Pharmacy Resident  9/30/2021 9:57 AM

## 2021-10-28 ENCOUNTER — HOSPITAL ENCOUNTER (OUTPATIENT)
Dept: PHARMACY | Age: 86
Setting detail: THERAPIES SERIES
Discharge: HOME | End: 2021-10-28
Payer: MEDICARE

## 2021-10-28 DIAGNOSIS — I48.91 ATRIAL FIBRILLATION, UNSPECIFIED TYPE (HCC): Primary | ICD-10-CM

## 2021-10-28 LAB — INTERNATIONAL NORMALIZATION RATIO, POC: 2.4

## 2021-10-28 PROCEDURE — 85610 PROTHROMBIN TIME: CPT

## 2021-10-28 PROCEDURE — 99211 OFF/OP EST MAY X REQ PHY/QHP: CPT

## 2021-10-28 NOTE — PROGRESS NOTES
Ms. Isreal Inman is a 80 y.o. y/o female with history of Afib who presents today for anticoagulation monitoring and adjustment. INR 2.4 is therapeutic for this patient (goal range 2-3) and is reflective of 15 mg TWD  Patient verifies current dosing regimen, patient able to verbally recall dose  Patient reports 0 missed doses since last INR   Patient denies s/sx clotting and/or stroke  Patient denies hematuria, epistaxis, rectal bleeding  Patient denies changes in diet, alcohol, or tobacco use  -Patient endorses that her appetite is much less since her  passed away in March.     Reviewed medication list and drug allergies with patient, updated any medication additions or modifications accordingly  Patient also denies any pending medical or dental procedures scheduled at this time    Patient was instructed to continue current 15 mg TWD and RTC 6 weeks    For Pharmacy Admin Tracking Only     Intervention Detail: Adherence Monitorin and Lab(s) Ordered   Total # of Interventions Recommended: 1   Total # of Interventions Accepted: 1   Time Spent (min): 15    Jayde Johnson PharmD  PGY-1 Pharmacy Resident  10/28/2021 9:55 AM

## 2021-12-09 ENCOUNTER — HOSPITAL ENCOUNTER (OUTPATIENT)
Dept: PHARMACY | Age: 86
Setting detail: THERAPIES SERIES
Discharge: HOME | End: 2021-12-09
Payer: MEDICARE

## 2021-12-09 DIAGNOSIS — I48.91 ATRIAL FIBRILLATION, UNSPECIFIED TYPE (HCC): Primary | ICD-10-CM

## 2021-12-09 LAB — INTERNATIONAL NORMALIZATION RATIO, POC: 2.4

## 2021-12-09 PROCEDURE — 85610 PROTHROMBIN TIME: CPT

## 2021-12-09 PROCEDURE — 99211 OFF/OP EST MAY X REQ PHY/QHP: CPT

## 2022-01-20 ENCOUNTER — HOSPITAL ENCOUNTER (OUTPATIENT)
Dept: PHARMACY | Age: 87
Setting detail: THERAPIES SERIES
Discharge: HOME | End: 2022-01-20
Payer: MEDICARE

## 2022-01-20 DIAGNOSIS — I48.91 ATRIAL FIBRILLATION, UNSPECIFIED TYPE (HCC): Primary | ICD-10-CM

## 2022-01-20 LAB — INTERNATIONAL NORMALIZATION RATIO, POC: 2.3

## 2022-01-20 PROCEDURE — 85610 PROTHROMBIN TIME: CPT

## 2022-01-20 PROCEDURE — 99211 OFF/OP EST MAY X REQ PHY/QHP: CPT

## 2022-01-20 NOTE — PROGRESS NOTES
Ms. Joya Bumpers is a 80 y.o. y/o female with history of Afib who presents today for anticoagulation monitoring and adjustment.   INR 2.3 is therapeutic for this patient (goal range 2-3) and is reflective of 15 mg TWD  Patient verifies current dosing regimen, patient able to verbally recall dose  Patient reports 0  missed doses since last INR   Patient denies s/sx clotting and/or stroke  Patient denies hematuria, epistaxis, rectal bleeding  Patient denies changes in diet, alcohol, or tobacco use  Reviewed medication list and drug allergies with patient, updated any medication additions or modifications accordingly  Patient also denies any pending medical or dental procedures scheduled at this time  Patient was instructed to continue 15 mg TWD and RTC 6 weeks    For Pharmacy Admin Tracking Only     Intervention Detail: Adherence Monitorin   Total # of Interventions Recommended: 1   Total # of Interventions Accepted: 1   Time Spent (min): 15

## 2022-02-10 LAB
FOLATE: 12.1 NG/ML
VITAMIN B-12: 300 PG/ML (ref 211–911)
VITAMIN D 25-HYDROXY: 41 NG/ML

## 2022-03-03 ENCOUNTER — HOSPITAL ENCOUNTER (OUTPATIENT)
Dept: PHARMACY | Age: 87
Setting detail: THERAPIES SERIES
Discharge: HOME | End: 2022-03-03
Payer: MEDICARE

## 2022-03-03 DIAGNOSIS — I48.91 ATRIAL FIBRILLATION, UNSPECIFIED TYPE (HCC): Primary | ICD-10-CM

## 2022-03-03 LAB — INTERNATIONAL NORMALIZATION RATIO, POC: 3.6

## 2022-03-03 PROCEDURE — 85610 PROTHROMBIN TIME: CPT

## 2022-03-03 PROCEDURE — 99211 OFF/OP EST MAY X REQ PHY/QHP: CPT

## 2022-03-03 NOTE — PROGRESS NOTES
Ms. Jey Greer is a 80 y.o. y/o female with history of Afib who presents today for anticoagulation monitoring and adjustment. INR 3.6 is supratherapeutic for this patient (goal range 2-3) and is reflective of 17.5 mg TWD  Patient verifies current dosing regimen, patient able to verbally recall dose  Patient reports no  missed doses since last INR   Patient denies s/sx clotting and/or stroke  Patient denies hematuria, epistaxis, rectal bleeding  Patient denies changes in diet, alcohol, or tobacco use  Reviewed medication list and drug allergies with patient, updated any medication additions or modifications accordingly    Patient had TIA's on feb 9 and went to [facility] where they increase her warfarin dosing to 2.5 mg daily and started aspirin. Patient states she had 2 INR's since being released and both were > 3.      Patient also denies any pending medical or dental procedures scheduled at this time  Patient was instructed to hold warfarin tonight then restart previous regimen of 15 mg TWD and RTC 2 weeks    For Pharmacy Admin Tracking Only     Intervention Detail: Dose Adjustment: 1, reason: Therapy Optimization and Lab(s) Ordered   Total # of Interventions Recommended: 2   Total # of Interventions Accepted: 2   Time Spent (min): 30    Shai MarieD, [facility]  Staff Pharmacist  3/3/2022 10:09 AM

## 2022-03-17 ENCOUNTER — HOSPITAL ENCOUNTER (OUTPATIENT)
Dept: PHARMACY | Age: 87
Setting detail: THERAPIES SERIES
Discharge: HOME | End: 2022-03-17
Payer: MEDICARE

## 2022-03-17 DIAGNOSIS — I48.20 CHRONIC ATRIAL FIBRILLATION (HCC): Primary | ICD-10-CM

## 2022-03-17 LAB — INTERNATIONAL NORMALIZATION RATIO, POC: 2.8

## 2022-03-17 PROCEDURE — 85610 PROTHROMBIN TIME: CPT | Performed by: PHARMACIST

## 2022-03-17 PROCEDURE — 99211 OFF/OP EST MAY X REQ PHY/QHP: CPT | Performed by: PHARMACIST

## 2022-03-17 NOTE — PROGRESS NOTES
Ms. Alexander Reed is a 80 y.o. y/o female with history of Afib who presents today for anticoagulation monitoring and adjustment. INR 2.8 is therapeutic for this patient (goal range 2-3) and is reflective of 15 mg TWD  Patient verifies current dosing regimen, patient able to verbally recall dose  Patient reports no  missed doses since last INR   Patient denies s/sx clotting and/or stroke  Patient denies hematuria, epistaxis, rectal bleeding  Patient denies changes in diet, alcohol, or tobacco use  Reviewed medication list and drug allergies with patient, updated any medication additions or modifications accordingly  Patient also denies any pending medical or dental procedures scheduled at this time  Patient was instructed to continue 15mg TWD and RTC 3 weeks. Pt request 4 week RTC d/t transportation issues.       For Pharmacy Admin Tracking Only     Intervention Detail: Adherence Monitorin   Total # of Interventions Recommended: 2   Total # of Interventions Accepted: 2   Time Spent (min): 30

## 2022-04-14 ENCOUNTER — HOSPITAL ENCOUNTER (OUTPATIENT)
Dept: PHARMACY | Age: 87
Setting detail: THERAPIES SERIES
Discharge: HOME | End: 2022-04-14
Payer: MEDICARE

## 2022-04-14 DIAGNOSIS — I48.91 ATRIAL FIBRILLATION, UNSPECIFIED TYPE (HCC): Primary | ICD-10-CM

## 2022-04-14 LAB — INTERNATIONAL NORMALIZATION RATIO, POC: 3.4

## 2022-04-14 PROCEDURE — 85610 PROTHROMBIN TIME: CPT

## 2022-04-14 PROCEDURE — 99211 OFF/OP EST MAY X REQ PHY/QHP: CPT

## 2022-04-14 NOTE — PROGRESS NOTES
Ms. Stoney Garrett is a 80 y.o. y/o female with history of Afib who presents today for anticoagulation monitoring and adjustment. Due to COVID protocol, extensive cleaning with Virex performed before and after visit. Masks worn by both parties the entire visit. INR 3.4 is supra-therapeutic for this patient (goal range 2-3) and is reflective of 15 mg TWD. Pt has been on the higher end of INR range. Patient verifies current dosing regimen, patient able to verbally recall dose  Patient reports 0 missed doses since last INR   Patient denies s/sx clotting and/or stroke  Patient denies hematuria, epistaxis, rectal bleeding  Patient denies changes in diet, alcohol, or tobacco use  Reviewed medication list and drug allergies with patient, updated any medication additions or modifications accordingly  Patient also denies any pending medical or dental procedures scheduled at this time  Pt has taken more Tylenol recently. Plans to continue this. Hips and groin have been hurting her. Patient was instructed to take partial dose today and reduce to 13.75 mg TWD and RTC 2 weeks. Pt prefers 4 weeks due to transportation issues, understands risks.      For Pharmacy Admin Tracking Only     Intervention Detail: Adherence Monitorin   Total # of Interventions Recommended: 0   Total # of Interventions Accepted: 0   Time Spent (min): 15

## 2022-04-26 LAB — SURGICAL PATHOLOGY REPORT: NORMAL

## 2022-04-28 ENCOUNTER — HOSPITAL ENCOUNTER (OUTPATIENT)
Dept: PHARMACY | Age: 87
Setting detail: THERAPIES SERIES
Discharge: HOME | End: 2022-04-28
Payer: MEDICARE

## 2022-04-28 DIAGNOSIS — I48.20 CHRONIC ATRIAL FIBRILLATION (HCC): Primary | ICD-10-CM

## 2022-04-28 LAB — INTERNATIONAL NORMALIZATION RATIO, POC: 1.5

## 2022-04-28 PROCEDURE — 99211 OFF/OP EST MAY X REQ PHY/QHP: CPT | Performed by: PHARMACIST

## 2022-04-28 PROCEDURE — 85610 PROTHROMBIN TIME: CPT | Performed by: PHARMACIST

## 2022-04-28 NOTE — PROGRESS NOTES
Ms. Lisseth Rosario is a 80 y.o. y/o female with history of Afib who presents today for anticoagulation monitoring and adjustment. INR 1.5 is subtherapeutic for this patient (goal range 2-3) and is reflective of 13.75 mg TWD  Patient verifies current dosing regimen, patient able to verbally recall dose. The patient was in [facility] , discharged 4-23-22. The pt states she was taking usual doses of warfarin while in that facility but had a dose of vit k prior to a procedure.   Patient reports no  missed doses since last INR   Patient denies s/sx clotting and/or stroke  Patient denies hematuria, epistaxis, rectal bleeding  Patient denies changes in diet, alcohol, or tobacco use  Reviewed medication list and drug allergies with patient, updated any medication additions or modifications accordingly  Patient also denies any pending medical or dental procedures scheduled at this time  Patient was instructed to take extra 1.25mg tomorrow (extra 9% this week only) otherwise follow usual 13.75mg TWD and RTC 2 weeks    For Pharmacy Admin Tracking Only     Intervention Detail: Dose Adjustment: 1, reason: Therapy Optimization   Total # of Interventions Recommended: 2   Total # of Interventions Accepted: 2   Time Spent (min): 30

## 2022-05-12 ENCOUNTER — HOSPITAL ENCOUNTER (OUTPATIENT)
Dept: PHARMACY | Age: 87
Setting detail: THERAPIES SERIES
Discharge: HOME | End: 2022-05-12
Payer: MEDICARE

## 2022-05-12 DIAGNOSIS — I48.20 CHRONIC ATRIAL FIBRILLATION (HCC): Primary | ICD-10-CM

## 2022-05-12 LAB — INTERNATIONAL NORMALIZATION RATIO, POC: 2.2

## 2022-05-12 PROCEDURE — 85610 PROTHROMBIN TIME: CPT | Performed by: PHARMACIST

## 2022-05-12 PROCEDURE — 99211 OFF/OP EST MAY X REQ PHY/QHP: CPT | Performed by: PHARMACIST

## 2022-05-12 NOTE — PROGRESS NOTES
Ms. Alesia Denise is a 80 y.o. y/o female with history of Afib who presents today for anticoagulation monitoring and adjustment. INR 2.2 is therapeutic for this patient (goal range 2-3) and is reflective of 13.75 mg TWD  Patient verifies current dosing regimen, patient able to verbally recall dose  Patient reports no  missed doses since last INR   Patient denies s/sx clotting and/or stroke  Patient denies hematuria, epistaxis, rectal bleeding  Patient denies changes in diet, alcohol, or tobacco use  Reviewed medication list and drug allergies with patient, updated any medication additions or modifications accordingly  Patient also denies any pending medical or dental procedures scheduled at this time. Patient states she will be increasing vitamin k foods in her diet (advised to keep consitent week to week). Patient was instructed to increase TWD to 15mg and RTC 3 weeks. This dose was stable at many visits prior to patient's hospital admission in April.     For Pharmacy Admin Tracking Only     Intervention Detail: Dose Adjustment: 1, reason: Therapy Optimization   Total # of Interventions Recommended: 2   Total # of Interventions Accepted: 2   Time Spent (min): 30

## 2022-06-02 ENCOUNTER — HOSPITAL ENCOUNTER (OUTPATIENT)
Dept: PHARMACY | Age: 87
Setting detail: THERAPIES SERIES
Discharge: HOME | End: 2022-06-02
Payer: MEDICARE

## 2022-06-02 DIAGNOSIS — I48.91 ATRIAL FIBRILLATION, UNSPECIFIED TYPE (HCC): Primary | ICD-10-CM

## 2022-06-02 LAB — INTERNATIONAL NORMALIZATION RATIO, POC: 2.1

## 2022-06-02 PROCEDURE — 85610 PROTHROMBIN TIME: CPT

## 2022-06-02 PROCEDURE — 99211 OFF/OP EST MAY X REQ PHY/QHP: CPT

## 2022-07-14 ENCOUNTER — HOSPITAL ENCOUNTER (OUTPATIENT)
Dept: PHARMACY | Age: 87
Setting detail: THERAPIES SERIES
Discharge: HOME | End: 2022-07-14
Payer: MEDICARE

## 2022-07-14 DIAGNOSIS — I48.91 ATRIAL FIBRILLATION, UNSPECIFIED TYPE (HCC): Primary | ICD-10-CM

## 2022-07-14 LAB — INTERNATIONAL NORMALIZATION RATIO, POC: 1.6

## 2022-07-14 PROCEDURE — 85610 PROTHROMBIN TIME: CPT

## 2022-07-14 PROCEDURE — 99211 OFF/OP EST MAY X REQ PHY/QHP: CPT

## 2022-07-14 NOTE — PROGRESS NOTES
Ms. Radha Drake is a 80 y.o. y/o female with history of Afib who presents today for anticoagulation monitoring and adjustment. INR 1.6 is sub-therapeutic for this patient (goal range 2-3) and is reflective of 15 mg TWD  Patient verifies current dosing regimen, patient able to verbally recall dose  Patient reports 0  missed doses since last INR   Patient denies s/sx clotting and/or stroke  Patient denies hematuria, epistaxis, rectal bleeding  Patient denies changes in diet, alcohol, or tobacco use  Reviewed medication list and drug allergies with patient, updated any medication additions or modifications accordingly. Pt states she may have been eating more greens lately.  Recommended consistency in diet  Patient also denies any pending medical or dental procedures scheduled at this time  Patient was instructed to boost today with extra 2.5 mg then increase TWD to 18.75 mg as pt has been on low end even when therapeutic recently and RTC 3 weeks (pt had been every 6 weeks due to transportation issues)      For Pharmacy Admin Tracking Only     Intervention Detail: Dose Adjustment: 1, reason: Therapy Optimization   Total # of Interventions Recommended: 1   Total # of Interventions Accepted: 1   Time Spent (min): 20

## 2022-07-28 ENCOUNTER — HOSPITAL ENCOUNTER (OUTPATIENT)
Dept: LAB | Age: 87
Discharge: HOME | End: 2022-07-28
Payer: MEDICARE

## 2022-07-28 LAB
ALBUMIN SERPL-MCNC: 4.1 G/DL (ref 3.5–4.6)
ALP BLD-CCNC: 81 U/L (ref 40–130)
ALT SERPL-CCNC: 14 U/L (ref 0–33)
ANION GAP SERPL CALCULATED.3IONS-SCNC: 12 MEQ/L (ref 9–15)
AST SERPL-CCNC: 22 U/L (ref 0–35)
BILIRUB SERPL-MCNC: 0.7 MG/DL (ref 0.2–0.7)
BUN BLDV-MCNC: 14 MG/DL (ref 8–23)
CALCIUM SERPL-MCNC: 9.9 MG/DL (ref 8.5–9.9)
CHLORIDE BLD-SCNC: 103 MEQ/L (ref 95–107)
CHOLESTEROL, TOTAL: 183 MG/DL (ref 0–199)
CO2: 26 MEQ/L (ref 20–31)
CREAT SERPL-MCNC: 0.94 MG/DL (ref 0.5–0.9)
GFR AFRICAN AMERICAN: >60
GFR NON-AFRICAN AMERICAN: 56.2
GLOBULIN: 3 G/DL (ref 2.3–3.5)
GLUCOSE BLD-MCNC: 117 MG/DL (ref 70–99)
HDLC SERPL-MCNC: 73 MG/DL (ref 40–59)
LDL CHOLESTEROL CALCULATED: 89 MG/DL (ref 0–129)
POTASSIUM SERPL-SCNC: 4.8 MEQ/L (ref 3.4–4.9)
SODIUM BLD-SCNC: 141 MEQ/L (ref 135–144)
TOTAL PROTEIN: 7.1 G/DL (ref 6.3–8)
TRIGL SERPL-MCNC: 104 MG/DL (ref 0–150)

## 2022-07-28 PROCEDURE — 80061 LIPID PANEL: CPT

## 2022-07-28 PROCEDURE — 80053 COMPREHEN METABOLIC PANEL: CPT

## 2022-08-04 ENCOUNTER — HOSPITAL ENCOUNTER (OUTPATIENT)
Dept: PHARMACY | Age: 87
Setting detail: THERAPIES SERIES
Discharge: HOME | End: 2022-08-04
Payer: MEDICARE

## 2022-08-04 DIAGNOSIS — I48.91 ATRIAL FIBRILLATION, UNSPECIFIED TYPE (HCC): Primary | ICD-10-CM

## 2022-08-04 LAB — INTERNATIONAL NORMALIZATION RATIO, POC: 2

## 2022-08-04 PROCEDURE — 99211 OFF/OP EST MAY X REQ PHY/QHP: CPT | Performed by: PHARMACIST

## 2022-08-04 PROCEDURE — 85610 PROTHROMBIN TIME: CPT | Performed by: PHARMACIST

## 2022-08-05 ENCOUNTER — HOSPITAL ENCOUNTER (OUTPATIENT)
Dept: WOMENS IMAGING | Age: 87
Discharge: HOME | End: 2022-08-07
Payer: MEDICARE

## 2022-08-05 DIAGNOSIS — Z12.31 ENCOUNTER FOR SCREENING MAMMOGRAM FOR MALIGNANT NEOPLASM OF BREAST: ICD-10-CM

## 2022-08-05 PROCEDURE — 77063 BREAST TOMOSYNTHESIS BI: CPT

## 2022-08-13 NOTE — PROGRESS NOTES
Ms. Camron Fenton is a 80 y.o. y/o female with history of Afib who presents today for anticoagulation monitoring and adjustment. INR 3.8  is supratherapeutic for this patient (goal range 2-3) and is reflective of 16.25 mg TWD  Patient verifies current dosing regimen, patient able to verbally recall dose  Patient reports no  missed doses since last INR   Patient denies s/sx clotting and/or stroke  Patient denies hematuria, epistaxis, rectal bleeding  Patient denies changes in alcohol, or tobacco use  Reviewed medication list and drug allergies with patient, updated any medication additions or modifications accordingly  Patient also denies any pending medical or dental procedures scheduled at this time. Pt states she has been eating fewer vit k foods and less food in general the past 1-2 months ( does not plan to change this). Patient was instructed to take only 1.25mg today and then decrease TWD 8% to 15mg and RTC 2-3 weeks. Pt will only RTC in 4 weeks, advised of risks.     For Pharmacy Admin Tracking Only     Intervention Detail: Dose Adjustment: 1, reason: Therapy De-escalation   Total # of Interventions Recommended: 2   Total # of Interventions Accepted: 2   Time Spent (min): 30 54 y/o M with pmhx HTN, HLD, T1DM (on insulin pump), CKD (s/p renal transplant), CVA, hx DVT (s/p Eliquis), CARMEN (on nocturnal CPAP), hx restrictive lung disease, squamous cell carcinoma OM, multiple toe amputations, presents to the ED from wound care with an episode of loss of consciousness while receiving hyperbaric oxygen treatment. Found to have R pleural effusion.     Syncope, HTN, HLD, pleural effusion  - Patient presented to ED from wound care with episode of possible syncope, ? seizure like activity unclear etiology  - Head CT shows no acute events. Neuro following   - Tele with SR 60-70s, no events     - TTE 07/2022 EF 65% with min MR/TR  - CXR- R pleural effusion   - Patient had prev thoracentesis for pleural effusion 2 months ago, pulm following   - Plan for Pleurocentesis - Monday - with IR - Loculated right effusion     - Pharm nuc stress test showed normal myocardial perfusion   - ECG shows NSR with PVCs  - Troponin: <-20.1, <-19.0  - No evidence of any active ischemia   - Continue aspirin and statin     - -170s   - Continue clonidine 0.1mg po bid, losartan 25mg po qd, metoprolol tartrate 25mg po tid, hydralazine 50mg po bid with hold parameters     - Monitor and replete lytes, keep K>4, Mg>2.  - Will continue to follow.    Jessica Wu MS FNP, [facility]  Nurse Practitioner- Cardiology   Spectra #[id number] /[phone]

## 2022-08-18 ENCOUNTER — HOSPITAL ENCOUNTER (OUTPATIENT)
Dept: PHARMACY | Age: 87
Setting detail: THERAPIES SERIES
Discharge: HOME | End: 2022-08-18
Payer: MEDICARE

## 2022-08-18 DIAGNOSIS — I48.0 PAROXYSMAL ATRIAL FIBRILLATION (HCC): Primary | ICD-10-CM

## 2022-08-18 LAB — INTERNATIONAL NORMALIZATION RATIO, POC: 2.4

## 2022-08-18 PROCEDURE — 99211 OFF/OP EST MAY X REQ PHY/QHP: CPT

## 2022-08-18 PROCEDURE — 85610 PROTHROMBIN TIME: CPT

## 2022-08-18 RX ORDER — WARFARIN SODIUM 2.5 MG/1
TABLET ORAL
Qty: 100 TABLET | Refills: 1 | Status: SHIPPED | OUTPATIENT
Start: 2022-08-18

## 2022-08-18 NOTE — PROGRESS NOTES
Ms. Shanell Sainz is a 80 y.o. y/o female with history of Afib who presents today for anticoagulation monitoring and adjustment.   INR 2.4 is therapeutic for this patient (goal range 2-3) and is reflective of 17.5 mg TWD  Patient verifies current dosing regimen, patient able to verbally recall dose  Patient reports 0  missed doses since last INR   Patient denies s/sx clotting and/or stroke  Patient denies hematuria, epistaxis, rectal bleeding  Patient denies changes in diet, alcohol, or tobacco use  Reviewed medication list and drug allergies with patient, updated any medication additions or modifications accordingly  Patient also denies any pending medical or dental procedures scheduled at this time  Patient was instructed to continue 17.5 mg TWD and RTC 2-3 weeks  For Pharmacy Admin Tracking Only    Intervention Detail: Adherence Monitorin  Total # of Interventions Recommended: 1  Total # of Interventions Accepted: 1  Time Spent (min): 15

## 2022-09-08 ENCOUNTER — HOSPITAL ENCOUNTER (OUTPATIENT)
Dept: PHARMACY | Age: 87
Setting detail: THERAPIES SERIES
Discharge: HOME | End: 2022-09-08
Payer: MEDICARE

## 2022-09-08 DIAGNOSIS — I48.91 ATRIAL FIBRILLATION, UNSPECIFIED TYPE (HCC): Primary | ICD-10-CM

## 2022-09-08 LAB — INTERNATIONAL NORMALIZATION RATIO, POC: 2.6

## 2022-09-08 PROCEDURE — 99211 OFF/OP EST MAY X REQ PHY/QHP: CPT

## 2022-09-08 PROCEDURE — 85610 PROTHROMBIN TIME: CPT

## 2022-09-22 ENCOUNTER — HOSPITAL ENCOUNTER (OUTPATIENT)
Dept: PHARMACY | Age: 87
Setting detail: THERAPIES SERIES
Discharge: HOME | End: 2022-09-22
Payer: MEDICARE

## 2022-09-22 DIAGNOSIS — I48.91 ATRIAL FIBRILLATION, UNSPECIFIED TYPE (HCC): Primary | ICD-10-CM

## 2022-09-22 LAB — INTERNATIONAL NORMALIZATION RATIO, POC: 3.2

## 2022-09-22 PROCEDURE — 85610 PROTHROMBIN TIME: CPT

## 2022-09-22 PROCEDURE — 99211 OFF/OP EST MAY X REQ PHY/QHP: CPT

## 2022-09-22 NOTE — PROGRESS NOTES
Ms. Cindy Hicks is a 80 y.o. y/o female with history of Afib who presents today for anticoagulation monitoring and adjustment. INR 3.2 slightly supra is slightly supra therapeutic for this patient (goal range 2.0-3.0) and is reflective of 16.25 mg TWD    Patient verifies current dosing regimen, patient able to verbally recall dose  Patient reports 0  missed doses since last INR   Patient denies s/sx clotting and/or stroke  Patient denies hematuria, epistaxis, rectal bleeding  Patient denies changes in diet, alcohol, or tobacco use  Reviewed medication list and drug allergies with patient, updated any medication additions or modifications accordingly  Patient also denies any pending medical or dental procedures scheduled at this time    Patient has enrolled in a program that delivers meals to her house. This has actually caused an increase in vegetable intake where she eats 1 cup of greens per day. She has been under a lot of stress lately. Her cardiologist is increasing her sotalol to the maximum dose, which requires a 3 day hospital stay starting on 9/30. He told her that, if this increase does not hel p, he was going to consider discontinuing all of her afib medications. This has the patient very worried as she states that she feels okay on her current regimen. Due to the increase in medical appointments, she has been having difficulties securing transportation and is experiencing guilty about asking for rides. I suspect her current supra therapeutic INR is directly related to her stress level. Patient believes she will continue to be stressed for a while until she is able to return to a more normal routine.  Patient was instructed to take 1.25 today and then change regimen to 1.25 on Wed and 2.5 mg all other days (TWD of 16.25 mg, 7.1% decrease) and RTC 10 days after released from [facility], PharmD  PGY-1 Pharmacy Resident  9/22/2022 9:23 AM    For Pharmacy Admin Tracking Only    Intervention Detail: Adherence Monitorin and Dose Adjustment: 2, reason: Therapy Optimization  Total # of Interventions Recommended: 2  Total # of Interventions Accepted: 2  Time Spent (min): 30

## 2022-10-13 ENCOUNTER — HOSPITAL ENCOUNTER (OUTPATIENT)
Dept: PHARMACY | Age: 87
Setting detail: THERAPIES SERIES
Discharge: HOME | End: 2022-10-13
Payer: MEDICARE

## 2022-10-13 DIAGNOSIS — I48.91 ATRIAL FIBRILLATION, UNSPECIFIED TYPE (HCC): Primary | ICD-10-CM

## 2022-10-13 LAB — INTERNATIONAL NORMALIZATION RATIO, POC: 2.5

## 2022-10-13 PROCEDURE — 99211 OFF/OP EST MAY X REQ PHY/QHP: CPT

## 2022-10-13 PROCEDURE — 85610 PROTHROMBIN TIME: CPT

## 2022-10-13 NOTE — PROGRESS NOTES
Ms. Tamar Haynes is a 80 y.o. y/o female with history of Afib who presents today for anticoagulation monitoring and adjustment. INR 2.5 is therapeutic for this patient (goal range 2-3) and is reflective of 16.25 mg TWD  Patient verifies current dosing regimen, patient able to verbally recall dose  Currently scheduled TWD is 16.25 mg. Patient reports 0 missed doses since last INR     Patient denies s/sx clotting and/or stroke  Patient denies hematuria, epistaxis, rectal bleeding  Patient denies changes in alcohol, or tobacco use  Reviewed medication list and drug allergies with patient, updated any medication additions or modifications accordingly  Patient also denies any pending medical or dental procedures scheduled at this time    Patient reports she was put on amiodarone and taken off sotolol during hospitalization, starting 9/30. Patient reports being give two load doses in the hospital and was instructed to take 1 daily until follow up appointment with Dr. Sanjana Sinclair. Patient states she was in a-fib at discharge and my have to be shocked if still in a-fib at follow up appointment. Patient had questions about procedure, answered as I could. Patient reports utilizing meals on wheels which includes 1-2 servings of greens with meal and is an increase from her usual diet which did not contain many greens at all. This is likely why number is well in range today. Given the new start of amiodarone on 9/30 dose was decreased in anticipation of INR rising over next few weeks. Patient was instructed to call clinic after 10/19 appointment with Dr. Sanjana Sinclair if any changes were made to the dose/ frequency. Patient reports difficulties with getting to appointments and clinics. Thursday's work best as this is the day her son has off. Discussed options for transportation assistance including [employer] and Medicare.     Patient was instructed to decrease weekly dose to 1.25 mg WF and 2.5 mg all other days for a TWD of 15 mg (7.7% decrease) and RTC 2 weeks    Robinson Dandy, PharmD  PGY1 Pharmacy Resident  10/13/2022 10:45 AM      For Pharmacy Admin Tracking Only    Intervention Detail: Adherence Monitorin and Dose Adjustment: 2, reason: Interaction, Therapy Optimization  Total # of Interventions Recommended: 2  Total # of Interventions Accepted: 2  Time Spent (min): 45

## 2022-10-27 ENCOUNTER — HOSPITAL ENCOUNTER (OUTPATIENT)
Dept: PHARMACY | Age: 87
Setting detail: THERAPIES SERIES
Discharge: HOME | End: 2022-10-27
Payer: MEDICARE

## 2022-10-27 DIAGNOSIS — I48.91 ATRIAL FIBRILLATION, UNSPECIFIED TYPE (HCC): Primary | ICD-10-CM

## 2022-10-27 LAB — INTERNATIONAL NORMALIZATION RATIO, POC: 3.7

## 2022-10-27 PROCEDURE — 85610 PROTHROMBIN TIME: CPT

## 2022-10-27 PROCEDURE — 99211 OFF/OP EST MAY X REQ PHY/QHP: CPT

## 2022-10-27 NOTE — PROGRESS NOTES
Ms. Kriss Hirsch is a 80 y.o. y/o female with history of Afib who presents today for anticoagulation monitoring and adjustment. INR 3.7 is supra-therapeutic for this patient (goal range 2-3) and is reflective of 15 mg TWD  Patient verifies current dosing regimen, patient able to verbally recall dose  Patients currently scheduled regimen is 15 mg TWD    Patient reports 0 missed doses since last INR     Patient denies s/sx clotting and/or stroke  Patient denies hematuria, epistaxis, rectal bleeding  Patient denies changes in diet, alcohol, or tobacco use  Reviewed medication list and drug allergies with patient, updated any medication additions or modifications accordingly  Patient also denies any pending medical or dental procedures scheduled at this time    Patient reports no changes in diet, still using meals on wheels and diet is consistent. Medications remain the same from last appointment. She is currently on amiodarone 200 mg, started during  hospitalization. Patient reports cardioversion to occur in the next week or two.      Patient was instructed to hold tonight's dose (usual 2.5 mg) and decrease to 1.25 mg MWF and 2.5 mg SSuTTh for TWD of 13.75 mg (8.3% decrease), RTC 2 weeks    Shai DaveyD  PGY1 Pharmacy Resident  10/27/2022 9:55 AM      For Pharmacy Admin Tracking Only    Intervention Detail: Adherence Monitorin and Dose Adjustment: 2, reason: Interaction, Therapy Optimization  Total # of Interventions Recommended: 2  Total # of Interventions Accepted: 2  Time Spent (min): 30

## 2022-11-10 ENCOUNTER — HOSPITAL ENCOUNTER (OUTPATIENT)
Dept: PHARMACY | Age: 87
Setting detail: THERAPIES SERIES
Discharge: HOME | End: 2022-11-10
Payer: MEDICARE

## 2022-11-10 DIAGNOSIS — I48.91 ATRIAL FIBRILLATION, UNSPECIFIED TYPE (HCC): Primary | ICD-10-CM

## 2022-11-10 LAB — INTERNATIONAL NORMALIZATION RATIO, POC: 2.6

## 2022-11-10 PROCEDURE — 99211 OFF/OP EST MAY X REQ PHY/QHP: CPT

## 2022-11-10 PROCEDURE — 85610 PROTHROMBIN TIME: CPT

## 2022-11-10 NOTE — PROGRESS NOTES
Ms. Dominguez King is a 80 y.o. y/o female with history of Afib who presents today for anticoagulation monitoring and adjustment. INR 2.6 is therapeutic for this patient (goal range 2-3) and is reflective of 13.75 mg TWD  Patient verifies current dosing regimen, patient able to verbally recall dose  Patient's currently scheduled dose is 13.75 mg. Patient reports 0 missed doses in last week or so. Patient denies s/sx clotting and/or stroke  Patient denies hematuria, epistaxis, rectal bleeding  Patient denies changes in diet, alcohol, or tobacco use  Reviewed medication list and drug allergies with patient, updated any medication additions or modifications accordingly  Patient also denies any pending medical or dental procedures scheduled at this time    Patient reports no changes. Still reports eating consistent amount of greens from meals-on-wheels and is still taking amiodarone 200 mg, no changes in dose. Patient had cardioversion last week and reports it went well. Patient had 2 decreases in dose upon starting amiodarone. No dose adjustments today.      Patient was instructed to continue with 13.75 mg TWD and RTC 4 weeks    Kraig Epley, PharmD  PGY1 Pharmacy Resident  11/10/2022 9:03 AM        For Pharmacy Admin Tracking Only    Intervention Detail: Adherence Monitorin  Total # of Interventions Recommended: 1  Total # of Interventions Accepted: 1  Time Spent (min): 30

## 2022-12-01 ENCOUNTER — HOSPITAL ENCOUNTER (OUTPATIENT)
Dept: PHARMACY | Age: 87
Setting detail: THERAPIES SERIES
Discharge: HOME | End: 2022-12-01
Payer: MEDICARE

## 2022-12-01 DIAGNOSIS — I48.91 ATRIAL FIBRILLATION, UNSPECIFIED TYPE (HCC): Primary | ICD-10-CM

## 2022-12-01 LAB — INTERNATIONAL NORMALIZATION RATIO, POC: 2

## 2022-12-01 PROCEDURE — 85610 PROTHROMBIN TIME: CPT

## 2022-12-01 PROCEDURE — 99211 OFF/OP EST MAY X REQ PHY/QHP: CPT

## 2022-12-01 NOTE — PROGRESS NOTES
Ms. Nya Holley is a 80 y.o. y/o female with history of Afib who presents today for anticoagulation monitoring and adjustment. INR 2. is therapeutic for this patient (goal range 2-3) and is reflective of 13.75 mg TWD  Patient verifies current dosing regimen, patient able to verbally recall dose  Patient reports 0  missed doses since last INR   Patient denies s/sx clotting and/or stroke  Patient denies hematuria, epistaxis, rectal bleeding  Patient denies changes in diet, alcohol, or tobacco use  Reviewed medication list and drug allergies with patient, updated any medication additions or modifications accordingly. Pt will be having a cardioversion on  and Dr Valerie Allen increased amiodarone to bid x 5 days (finishing  then decreasing back to once daily. ) Advised pt of drug interaction and that INR will likely be higher at next appt but currently at low end of therapeutic range and amiodarone increase is short term.   Patient also denies any pending medical or dental procedures scheduled at this time  Patient was instructed to continue 13.75 TWD and RTC 2 weeks (following closely due to amio increase)          For Pharmacy Admin Tracking Only    Intervention Detail: Adherence Monitorin  Total # of Interventions Recommended: 0  Total # of Interventions Accepted: 0  Time Spent (min): 20

## 2022-12-15 ENCOUNTER — HOSPITAL ENCOUNTER (OUTPATIENT)
Dept: PHARMACY | Age: 87
Setting detail: THERAPIES SERIES
Discharge: HOME | End: 2022-12-15
Payer: MEDICARE

## 2022-12-15 DIAGNOSIS — I48.0 PAROXYSMAL ATRIAL FIBRILLATION (HCC): Primary | ICD-10-CM

## 2022-12-15 LAB — INTERNATIONAL NORMALIZATION RATIO, POC: 2.9

## 2022-12-15 PROCEDURE — 85610 PROTHROMBIN TIME: CPT

## 2022-12-15 PROCEDURE — 99211 OFF/OP EST MAY X REQ PHY/QHP: CPT

## 2022-12-15 NOTE — PROGRESS NOTES
Ms. Denita Barrientos is a 80 y.o. y/o female with history of Afib who presents today for anticoagulation monitoring and adjustment.   INR 2.9 is therapeutic for this patient (goal range 2-3) and is reflective of 13.75 mg TWD  Patient verifies current dosing regimen, patient able to verbally recall dose  Patient reports 0  missed doses since last INR   Patient denies s/sx clotting and/or stroke  Patient denies hematuria, epistaxis, rectal bleeding  Patient denies changes in diet, alcohol, or tobacco use  Reviewed medication list and drug allergies with patient, updated any medication additions or modifications accordingly  Patient is now taking amiodarone once daily, may have adjustment at 12/23 visit, will let clinic know of any changes  Patient also denies any pending medical or dental procedures scheduled at this time  Patient was instructed to take 1.25 mg today (usual 2.5 mg), then resume 13.75 mg TWD and RTC 2 weeks  Will monitor closely while amiodarone dose is adjusted  For Pharmacy Admin Tracking Only    Intervention Detail: Dose Adjustment: 1, reason: Therapy Optimization  Total # of Interventions Recommended: 2  Total # of Interventions Accepted: 2  Time Spent (min): 20

## 2022-12-29 ENCOUNTER — HOSPITAL ENCOUNTER (OUTPATIENT)
Dept: PHARMACY | Age: 87
Setting detail: THERAPIES SERIES
Discharge: HOME | End: 2022-12-29
Payer: MEDICARE

## 2022-12-29 DIAGNOSIS — I48.91 ATRIAL FIBRILLATION, UNSPECIFIED TYPE (HCC): Primary | ICD-10-CM

## 2022-12-29 LAB — INTERNATIONAL NORMALIZATION RATIO, POC: 2.6

## 2022-12-29 PROCEDURE — 99211 OFF/OP EST MAY X REQ PHY/QHP: CPT

## 2022-12-29 PROCEDURE — 85610 PROTHROMBIN TIME: CPT

## 2022-12-29 NOTE — PROGRESS NOTES
Ms. Estefany Almanzar is a 80 y.o. y/o female with history of Afib who presents today for anticoagulation monitoring and adjustment.     INR 2.6 is therapeutic for this patient (goal range 2-3) and is reflective of 13.75 mg TWD  Patient verifies current dosing regimen, patient able to verbally recall dose  Patient reports 0 missed doses since last INR   Patient denies s/sx clotting and/or stroke  Patient denies hematuria, epistaxis, rectal bleeding  Patient denies changes in diet, alcohol, or tobacco use  Reviewed medication list and drug allergies with patient, updated any medication additions or modifications accordingly  Patient is  amiodarone once daily since early November, may have adjustment at next visit, will let clinic know of any changes  Patient also denies any pending medical or dental procedures scheduled at this time  Patient was instructed to continue 13.75 mg TWD and RTC 3 weeks      For Pharmacy Admin Tracking Only    Intervention Detail: Adherence Monitorin  Total # of Interventions Recommended: 0  Total # of Interventions Accepted: 0  Time Spent (min): 20
Is This A New Presentation, Or A Follow-Up?: Skin Lesion

## 2023-01-19 ENCOUNTER — HOSPITAL ENCOUNTER (OUTPATIENT)
Dept: PHARMACY | Age: 88
Setting detail: THERAPIES SERIES
Discharge: HOME | End: 2023-01-19
Payer: MEDICARE

## 2023-01-19 DIAGNOSIS — I48.91 ATRIAL FIBRILLATION, UNSPECIFIED TYPE (HCC): Primary | ICD-10-CM

## 2023-01-19 LAB — INTERNATIONAL NORMALIZATION RATIO, POC: 2.8

## 2023-01-19 PROCEDURE — 85610 PROTHROMBIN TIME: CPT

## 2023-01-19 PROCEDURE — 99211 OFF/OP EST MAY X REQ PHY/QHP: CPT

## 2023-01-19 NOTE — PROGRESS NOTES
Ms. Alexander Bingham is a 80 y.o. y/o female with history of Afib who presents today for anticoagulation monitoring and adjustment. INR 2.8 is therapeutic for this patient (goal range 2.0-3.0) and is reflective of 13.75 mg TWD  Patient verifies current dosing regimen, patient able to verbally recall dose  Patient reports 0  missed doses since last INR   Patient denies s/sx clotting and/or stroke  Patient denies hematuria, epistaxis, rectal bleeding  Patient denies changes in diet, alcohol, or tobacco use  Reviewed medication list and drug allergies with patient, updated any medication additions or modifications accordingly  Patient also denies any pending medical or dental procedures scheduled at this time  Patient was taken off of amiodarone on . Prior to that, she was being tapered off (dose was changed to 100mg daily in November). INR therapeutic today, and this should be as expected as amiodarone has a significantly long half-life. Would anticipate increase in INR at next visit.  Patient was instructed to continue current regimen of 1.25mg MoWeFr, 2.5mg all other days and RTC 4 weeks    For Pharmacy Admin Tracking Only    Intervention Detail: Adherence Monitorin and Lab(s) Ordered  Total # of Interventions Recommended: 1  Total # of Interventions Accepted: 1  Time Spent (min): 1516 SAL Cortez, PharmD   2023 9:03 AM

## 2023-02-16 ENCOUNTER — HOSPITAL ENCOUNTER (OUTPATIENT)
Dept: PHARMACY | Age: 88
Setting detail: THERAPIES SERIES
Discharge: HOME | End: 2023-02-16
Payer: MEDICARE

## 2023-02-16 DIAGNOSIS — I48.20 CHRONIC ATRIAL FIBRILLATION (HCC): Primary | ICD-10-CM

## 2023-02-16 LAB — INTERNATIONAL NORMALIZATION RATIO, POC: 2.2

## 2023-02-16 PROCEDURE — 85610 PROTHROMBIN TIME: CPT | Performed by: PHARMACIST

## 2023-02-16 PROCEDURE — 99211 OFF/OP EST MAY X REQ PHY/QHP: CPT | Performed by: PHARMACIST

## 2023-02-16 RX ORDER — BIMATOPROST 0.3 MG/ML
1 SOLUTION/ DROPS OPHTHALMIC NIGHTLY
COMMUNITY

## 2023-02-16 NOTE — PROGRESS NOTES
Ms. Aliza Solano is a 80 y.o. y/o female with history of Afib who presents today for anticoagulation monitoring and adjustment. INR 2.2 is therapeutic for this patient (goal range 2-3) and is reflective of 13.75 mg TWD  Patient verifies current dosing regimen, patient able to verbally recall dose  Patient reports no  missed doses since last INR   Patient denies s/sx clotting and/or stroke  Patient denies hematuria, epistaxis, rectal bleeding  Patient denies changes in diet, alcohol, or tobacco use  Reviewed medication list and drug allergies with patient, updated any medication additions or modifications accordingly  Patient also denies any pending medical or dental procedures scheduled at this time  Patient was taken off of amiodarone on . INR therapeutic today but is in lower-end of goal range. Will need to continue to monitor for decreases in INR but now is 6 weeks after last dose of amiodarone per patient.   Patient was instructed to continue 13.75mg TWD and RTC 4 weeks      For Pharmacy Admin Tracking Only    Intervention Detail: Adherence Monitorin  Total # of Interventions Recommended: 1  Total # of Interventions Accepted: 1  Time Spent (min): 30

## 2023-03-02 ENCOUNTER — HOSPITAL ENCOUNTER (OUTPATIENT)
Dept: LAB | Age: 88
Discharge: HOME | End: 2023-03-02
Payer: MEDICARE

## 2023-03-02 LAB
ALBUMIN SERPL-MCNC: 4.2 G/DL (ref 3.5–4.6)
ALP BLD-CCNC: 93 U/L (ref 40–130)
ALT SERPL-CCNC: 25 U/L (ref 0–33)
ANION GAP SERPL CALCULATED.3IONS-SCNC: 13 MEQ/L (ref 9–15)
AST SERPL-CCNC: 25 U/L (ref 0–35)
BILIRUB SERPL-MCNC: 0.7 MG/DL (ref 0.2–0.7)
BUN BLDV-MCNC: 22 MG/DL (ref 8–23)
CALCIUM SERPL-MCNC: 9.7 MG/DL (ref 8.5–9.9)
CHLORIDE BLD-SCNC: 105 MEQ/L (ref 95–107)
CHOLESTEROL, TOTAL: 168 MG/DL (ref 0–199)
CO2: 25 MEQ/L (ref 20–31)
CREAT SERPL-MCNC: 1.07 MG/DL (ref 0.5–0.9)
GFR SERPL CREATININE-BSD FRML MDRD: 49.8 ML/MIN/{1.73_M2}
GLOBULIN: 2.9 G/DL (ref 2.3–3.5)
GLUCOSE BLD-MCNC: 103 MG/DL (ref 70–99)
HDLC SERPL-MCNC: 74 MG/DL (ref 40–59)
LDL CHOLESTEROL CALCULATED: 78 MG/DL (ref 0–129)
POTASSIUM SERPL-SCNC: 4.3 MEQ/L (ref 3.4–4.9)
SODIUM BLD-SCNC: 143 MEQ/L (ref 135–144)
TOTAL PROTEIN: 7.1 G/DL (ref 6.3–8)
TRIGL SERPL-MCNC: 79 MG/DL (ref 0–150)

## 2023-03-02 PROCEDURE — 80061 LIPID PANEL: CPT

## 2023-03-02 PROCEDURE — 80053 COMPREHEN METABOLIC PANEL: CPT

## 2023-03-16 ENCOUNTER — HOSPITAL ENCOUNTER (OUTPATIENT)
Dept: PHARMACY | Age: 88
Setting detail: THERAPIES SERIES
Discharge: HOME | End: 2023-03-16
Payer: MEDICARE

## 2023-03-16 DIAGNOSIS — I48.20 CHRONIC ATRIAL FIBRILLATION (HCC): Primary | ICD-10-CM

## 2023-03-16 LAB — INTERNATIONAL NORMALIZATION RATIO, POC: 2.5

## 2023-03-16 PROCEDURE — 85610 PROTHROMBIN TIME: CPT | Performed by: PHARMACIST

## 2023-03-16 PROCEDURE — 99211 OFF/OP EST MAY X REQ PHY/QHP: CPT | Performed by: PHARMACIST

## 2023-03-16 RX ORDER — WARFARIN SODIUM 2.5 MG/1
TABLET ORAL
Qty: 90 TABLET | Refills: 1 | Status: SHIPPED | OUTPATIENT
Start: 2023-03-16

## 2023-03-16 NOTE — PROGRESS NOTES
Ms. Rachel Velez is a 80 y.o. y/o female with history of Afib who presents today for anticoagulation monitoring and adjustment. INR 2.5 is therapeutic for this patient (goal range 2-3) and is reflective of 13.75 mg TWD  Patient verifies current dosing regimen, patient able to verbally recall dose  Patient reports no  missed doses since last INR   Patient denies s/sx clotting and/or stroke  Patient denies hematuria, epistaxis, rectal bleeding  Patient denies changes in diet, alcohol, or tobacco use  Reviewed medication list and drug allergies with patient, updated any medication additions or modifications accordingly  Patient also denies any pending medical or dental procedures scheduled at this time    Patient was taken off of amiodarone on  (was only 2 months of tx) ,  now 10 weeks after last dose of amiodarone per patient.      Patient was instructed to continue 13.75mg TWD and RTC 5 weeks (consider 6 week RTC next appt if stable)    For Pharmacy Admin Tracking Only    Intervention Detail: Adherence Monitorin  Total # of Interventions Recommended: 1  Total # of Interventions Accepted: 1  Time Spent (min): 20

## 2023-04-20 ENCOUNTER — HOSPITAL ENCOUNTER (OUTPATIENT)
Dept: PHARMACY | Age: 88
Setting detail: THERAPIES SERIES
Discharge: HOME | End: 2023-04-20
Payer: MEDICARE

## 2023-04-20 DIAGNOSIS — I48.91 ATRIAL FIBRILLATION, UNSPECIFIED TYPE (HCC): Primary | ICD-10-CM

## 2023-04-20 LAB — INTERNATIONAL NORMALIZATION RATIO, POC: 1.9

## 2023-04-20 PROCEDURE — 85610 PROTHROMBIN TIME: CPT

## 2023-04-20 PROCEDURE — 99211 OFF/OP EST MAY X REQ PHY/QHP: CPT

## 2023-04-20 NOTE — PROGRESS NOTES
Ms. Magnus Muñoz is a 80 y.o. y/o female with history of Afib who presents today for anticoagulation monitoring and adjustment. INR 1.9 is sl sub-therapeutic for this patient (goal range 2-3) and is reflective of 13.75 mg TWD  Patient verifies current dosing regimen, patient able to verbally recall dose  Patient reports 0  missed doses since last INR   Patient denies s/sx clotting and/or stroke  Patient denies hematuria, epistaxis, rectal bleeding  Patient denies changes in diet, alcohol, or tobacco use  Reviewed medication list and drug allergies with patient, updated any medication additions or modifications accordingly  Patient also denies any pending medical or dental procedures scheduled at this time.  Pt ate extra green vegetables yesterday, has been stable in regimen  Patient was instructed to continue 13.75 TWD and RTC 6 weeks    For Pharmacy Admin Tracking Only    Intervention Detail: Adherence Monitorin  Total # of Interventions Recommended: 1  Total # of Interventions Accepted: 1  Time Spent (min): 15

## 2023-06-01 ENCOUNTER — HOSPITAL ENCOUNTER (OUTPATIENT)
Dept: PHARMACY | Age: 88
Setting detail: THERAPIES SERIES
Discharge: HOME | End: 2023-06-01
Payer: MEDICARE

## 2023-06-01 DIAGNOSIS — I48.91 ATRIAL FIBRILLATION, UNSPECIFIED TYPE (HCC): Primary | ICD-10-CM

## 2023-06-01 LAB — INTERNATIONAL NORMALIZATION RATIO, POC: 2.1

## 2023-06-01 PROCEDURE — 85610 PROTHROMBIN TIME: CPT

## 2023-06-01 PROCEDURE — 99211 OFF/OP EST MAY X REQ PHY/QHP: CPT

## 2023-06-01 NOTE — PROGRESS NOTES
Ms. Ramon Castanon is a 80 y.o. y/o female with history of Afib who presents today for anticoagulation monitoring and adjustment.     INR 2.1 is therapeutic for this patient (goal range 2-3) and is reflective of 13.75 mg TWD  Patient verifies current dosing regimen, patient able to verbally recall dose  Patient reports 0  missed doses since last INR   Patient denies s/sx clotting and/or stroke  Patient denies hematuria, epistaxis, rectal bleeding  Patient denies changes in diet, alcohol, or tobacco use  Reviewed medication list and drug allergies with patient, updated any medication additions or modifications accordingly  Patient also denies any pending medical or dental procedures scheduled at this time  Patient was instructed to continue 13.75 mg TWD and RTC 6 weeks    For Pharmacy Admin Tracking Only    Intervention Detail: Adherence Monitorin  Total # of Interventions Recommended: 1  Total # of Interventions Accepted: 1  Time Spent (min): 15

## 2023-07-13 ENCOUNTER — HOSPITAL ENCOUNTER (OUTPATIENT)
Dept: PHARMACY | Age: 88
Setting detail: THERAPIES SERIES
Discharge: HOME | End: 2023-07-13
Payer: MEDICARE

## 2023-07-13 DIAGNOSIS — I48.91 ATRIAL FIBRILLATION, UNSPECIFIED TYPE (HCC): Primary | ICD-10-CM

## 2023-07-13 LAB — INTERNATIONAL NORMALIZATION RATIO, POC: 1.5

## 2023-07-13 PROCEDURE — 85610 PROTHROMBIN TIME: CPT

## 2023-07-13 PROCEDURE — 99211 OFF/OP EST MAY X REQ PHY/QHP: CPT

## 2023-07-13 NOTE — PROGRESS NOTES
Ms. Doris Delgadillo is a 80 y.o. y/o female with history of Afib who presents today for anticoagulation monitoring and adjustment. INR 1.5 is subtherapeutic for this patient (goal range 2-3) and is reflective of 13.75 mg TWD    Patient verifies current dosing regimen, patient able to verbally recall dose  Patient reports 0 missed doses since last INR   Patient denies s/sx clotting and/or stroke  Patient denies hematuria, epistaxis, rectal bleeding  Patient denies changes in diet, alcohol, or tobacco use  Reviewed medication list and drug allergies with patient, updated any medication additions or modifications accordingly  Patient also denies any pending medical or dental procedures scheduled at this time    No discernible reason for low INR. No changes per patient, who has been a stable 6 week patient. Patient has been getting meals delivered to her for over a year now, and per patient the vegetable portions are consistent      Patient has been trending/borderline subtherapeutic the previous two appts.  Discussed with patient the possibility of needing to increase dose at next appt, and patient is agreeable to coming back sooner than 6 weeks prior to making an adjustment    Patient was instructed to boost with 5 mg today and then resume 13.75 mg TWD and RTC 3 weeks    Selma Byrne, [place]  2023 9:18 AM      For Pharmacy Admin Tracking Only    Intervention Detail: Adherence Monitorin and Dose Adjustment: 1, reason: Therapy Optimization  Total # of Interventions Recommended: 2  Total # of Interventions Accepted: 2  Time Spent (min): 15

## 2023-08-10 ENCOUNTER — HOSPITAL ENCOUNTER (OUTPATIENT)
Dept: PHARMACY | Age: 88
Setting detail: THERAPIES SERIES
Discharge: HOME | End: 2023-08-10
Payer: MEDICARE

## 2023-08-10 DIAGNOSIS — I48.91 ATRIAL FIBRILLATION, UNSPECIFIED TYPE (HCC): Primary | ICD-10-CM

## 2023-08-10 LAB — INTERNATIONAL NORMALIZATION RATIO, POC: 1.6

## 2023-08-10 PROCEDURE — 99211 OFF/OP EST MAY X REQ PHY/QHP: CPT | Performed by: PHARMACIST

## 2023-08-10 PROCEDURE — 85610 PROTHROMBIN TIME: CPT | Performed by: PHARMACIST

## 2023-08-10 NOTE — PROGRESS NOTES
Ms. Dl Clayton is a 80 y.o. y/o female with history of Afib who presents today for anticoagulation monitoring and adjustment. INR 1.6 is subtherapeutic for this patient (goal range 2.0-3.0) and is reflective of 13.75 mg TWD    Patient verifies current dosing regimen, patient able to verbally recall dose  Patient reports 0  missed doses since last INR   Patient denies s/sx clotting and/or stroke  Patient denies hematuria, epistaxis, rectal bleeding  Patient denies changes in diet, alcohol, or tobacco use  Reviewed medication list and drug allergies with patient, updated any medication additions or modifications accordingly  Patient also denies any pending medical or dental procedures scheduled at this time    Patient expressed feeling concerned her numbers have been low. She has been experiencing more stress since her best friend  last week, as well as still struggling with the death of her . Patient was instructed to boost today and take 2 tablets and then change her weekly regimen on  to 1 whole tablet and RTC 2 weeks, but patient preferred to return in 3 weeks.      For Pharmacy Admin Tracking Only    Intervention Detail: Dose Adjustment: 2, reason: Therapy Optimization  Total # of Interventions Recommended: 2  Total # of Interventions Accepted: 2  Time Spent (min): 15

## 2023-08-31 ENCOUNTER — HOSPITAL ENCOUNTER (OUTPATIENT)
Dept: PHARMACY | Age: 88
Setting detail: THERAPIES SERIES
Discharge: HOME | End: 2023-08-31
Payer: MEDICARE

## 2023-08-31 DIAGNOSIS — I48.91 ATRIAL FIBRILLATION, UNSPECIFIED TYPE (HCC): Primary | ICD-10-CM

## 2023-08-31 LAB — INTERNATIONAL NORMALIZATION RATIO, POC: 2.1

## 2023-08-31 PROCEDURE — 99211 OFF/OP EST MAY X REQ PHY/QHP: CPT

## 2023-08-31 PROCEDURE — 85610 PROTHROMBIN TIME: CPT

## 2023-08-31 RX ORDER — WARFARIN SODIUM 2.5 MG/1
TABLET ORAL
Qty: 90 TABLET | Refills: 1 | Status: SHIPPED | OUTPATIENT
Start: 2023-08-31

## 2023-08-31 NOTE — PROGRESS NOTES
Ms. Franny Kiser is a 80 y.o. y/o female with history of Afib who presents today for anticoagulation monitoring and adjustment. INR 2.1 is therapeutic for this patient (goal range 2-3) and is reflective of 15 mg TWD. Patient verifies current dosing regimen, patient able to verbally recall dose. Patient reports 0 missed doses since last INR. Patient denies s/sx clotting and/or stroke. Patient denies hematuria, epistaxis, rectal bleeding. Patient denies changes in diet, alcohol, or tobacco use. Reviewed medication list and drug allergies with patient, updated any medication additions or modifications accordingly. Patient also denies any pending medical or dental procedures scheduled at this time. Patient requested a refill. Confirmed pharmacy and sent to [facility] mail order. Patient was instructed to continue 15 mg TWD and RTC 4 weeks. (Requested patient come back in 3 weeks, but she prefers 4 weeks due to transportation).     Bridgette Peralta, PharmD  PGY1 Pharmacy Resident  2023 8:26 AM      For Pharmacy Admin Tracking Only    Intervention Detail: Adherence Monitorin  Total # of Interventions Recommended: 1  Total # of Interventions Accepted: 1  Time Spent (min): 15

## 2023-09-28 ENCOUNTER — HOSPITAL ENCOUNTER (OUTPATIENT)
Dept: PHARMACY | Age: 88
Setting detail: THERAPIES SERIES
Discharge: HOME | End: 2023-09-28
Payer: MEDICARE

## 2023-09-28 DIAGNOSIS — I48.91 ATRIAL FIBRILLATION, UNSPECIFIED TYPE (HCC): Primary | ICD-10-CM

## 2023-09-28 LAB — INTERNATIONAL NORMALIZATION RATIO, POC: 1.3

## 2023-09-28 PROCEDURE — 99211 OFF/OP EST MAY X REQ PHY/QHP: CPT

## 2023-09-28 PROCEDURE — 85610 PROTHROMBIN TIME: CPT

## 2023-09-28 RX ORDER — VERAPAMIL HYDROCHLORIDE 240 MG/1
240 CAPSULE, EXTENDED RELEASE ORAL EVERY MORNING
COMMUNITY

## 2023-09-28 RX ORDER — CYANOCOBALAMIN (VITAMIN B-12) 500 MCG
1000 TABLET ORAL DAILY
COMMUNITY

## 2023-09-28 RX ORDER — GLUCOSAMINE/D3/BOSWELLIA SERRA 1500MG-400
1000 TABLET ORAL DAILY
COMMUNITY

## 2023-09-28 NOTE — PROGRESS NOTES
Ms. Fannie Street is a 80 y.o. y/o female with history of Afib who presents today for anticoagulation monitoring and adjustment. INR 1.3 is sub-therapeutic for this patient (goal range 2-3) and is reflective of 15 mg TWD  Patient verifies current dosing regimen, patient able to verbally recall dose  Patient reports 0  missed doses since last INR   Patient denies s/sx clotting and/or stroke  Patient denies hematuria, epistaxis, rectal bleeding  Patient denies changes in diet, alcohol, or tobacco use  Reviewed medication list and drug allergies with patient, updated any medication additions or modifications accordingly. Pt has been eating greens in her prepared meals. Advised pt to avoid the spinach and decrease other greens.  Pt is in persistent a fib so would prefer to see her a bit higher in range as she has been low even when therapeutic (ie 2.1 last appt)  Patient also denies any pending medical or dental procedures scheduled at this time  Patient was instructed to boost today with extra 1.25 mg x 1 then increase TWD to 17.5 mg  and RTC 4 weeks (pt has transportation issues)    For Pharmacy Admin Tracking Only    Intervention Detail: Dose Adjustment: 1, reason: Therapy Optimization  Total # of Interventions Recommended: 1  Total # of Interventions Accepted: 1  Time Spent (min): 20

## 2023-10-02 DIAGNOSIS — I49.5 SINOATRIAL NODE DYSFUNCTION (MULTI): ICD-10-CM

## 2023-10-02 DIAGNOSIS — Z95.0 CARDIAC PACEMAKER: Primary | ICD-10-CM

## 2023-10-04 ENCOUNTER — HOSPITAL ENCOUNTER (OUTPATIENT)
Dept: CARDIOLOGY | Facility: HOSPITAL | Age: 88
Discharge: HOME | End: 2023-10-04
Payer: MEDICARE

## 2023-10-04 DIAGNOSIS — I49.5 SINOATRIAL NODE DYSFUNCTION (MULTI): ICD-10-CM

## 2023-10-04 DIAGNOSIS — Z95.0 CARDIAC PACEMAKER IN SITU: ICD-10-CM

## 2023-10-04 PROCEDURE — 93296 REM INTERROG EVL PM/IDS: CPT

## 2023-10-04 PROCEDURE — 93294 REM INTERROG EVL PM/LDLS PM: CPT | Performed by: INTERNAL MEDICINE

## 2023-10-26 ENCOUNTER — HOSPITAL ENCOUNTER (OUTPATIENT)
Dept: PHARMACY | Age: 88
Setting detail: THERAPIES SERIES
Discharge: HOME | End: 2023-10-26
Payer: MEDICARE

## 2023-10-26 DIAGNOSIS — I48.91 ATRIAL FIBRILLATION, UNSPECIFIED TYPE (HCC): Primary | ICD-10-CM

## 2023-10-26 LAB — INTERNATIONAL NORMALIZATION RATIO, POC: 2.1

## 2023-10-26 PROCEDURE — 85610 PROTHROMBIN TIME: CPT

## 2023-10-26 PROCEDURE — 99211 OFF/OP EST MAY X REQ PHY/QHP: CPT

## 2023-10-26 NOTE — PROGRESS NOTES
Ms. Dangelo Hull is a 80 y.o. y/o female with history of Afib who presents today for anticoagulation monitoring and adjustment.   INR 2.1 is therapeutic for this patient (goal range 2-3) and is reflective of 16.25 mg TWD  Patient verifies current dosing regimen, patient able to verbally recall dose  Patient reports 0  missed doses since last INR   Patient denies s/sx clotting and/or stroke  Patient denies hematuria, epistaxis, rectal bleeding  Patient denies changes in diet, alcohol, or tobacco use  Reviewed medication list and drug allergies with patient, updated any medication additions or modifications accordingly  Patient also denies any pending medical or dental procedures scheduled at this time  Patient was instructed to continue 16.25 mg TWD and RTC 4 weeks    For Pharmacy Admin Tracking Only    Intervention Detail: Adherence Monitorin  Total # of Interventions Recommended: 1  Total # of Interventions Accepted: 1  Time Spent (min): 15

## 2023-11-30 ENCOUNTER — HOSPITAL ENCOUNTER (OUTPATIENT)
Dept: PHARMACY | Age: 88
Setting detail: THERAPIES SERIES
Discharge: HOME | End: 2023-11-30
Payer: MEDICARE

## 2023-11-30 DIAGNOSIS — I48.91 ATRIAL FIBRILLATION, UNSPECIFIED TYPE (HCC): Primary | ICD-10-CM

## 2023-11-30 LAB — INTERNATIONAL NORMALIZATION RATIO, POC: 1.8

## 2023-11-30 PROCEDURE — 85610 PROTHROMBIN TIME: CPT

## 2023-11-30 PROCEDURE — 99211 OFF/OP EST MAY X REQ PHY/QHP: CPT

## 2023-11-30 NOTE — PROGRESS NOTES
Ms. Luh Darden is a 80 y.o. y/o female with history of Afib who presents today for anticoagulation monitoring and adjustment.   INR 1.8 is sub-therapeutic for this patient (goal range 2-3) and is reflective of 16.25 mg TWD  Patient verifies current dosing regimen, patient able to verbally recall dose  Patient reports 0  missed doses since last INR   Patient denies s/sx clotting and/or stroke  Patient denies hematuria, epistaxis, rectal bleeding  Patient denies changes in diet, alcohol, or tobacco use  Reviewed medication list and drug allergies with patient, updated any medication additions or modifications accordingly  Patient also denies any pending medical or dental procedures scheduled at this time  Pt is in persistent a fib so would prefer to see her a bit higher in range as she has been low even when therapeutic   Patient was instructed to increase to 17.5 mg TWD and RTC 4 weeks    For Pharmacy Admin Tracking Only    Intervention Detail: Adherence Monitorin  Total # of Interventions Recommended: 2  Total # of Interventions Accepted: 2  Time Spent (min): 15

## 2023-12-28 ENCOUNTER — HOSPITAL ENCOUNTER (OUTPATIENT)
Dept: PHARMACY | Age: 88
Setting detail: THERAPIES SERIES
Discharge: HOME | End: 2023-12-28
Payer: MEDICARE

## 2023-12-28 DIAGNOSIS — I48.91 ATRIAL FIBRILLATION, UNSPECIFIED TYPE (HCC): Primary | ICD-10-CM

## 2023-12-28 DIAGNOSIS — I10 HYPERTENSION, UNSPECIFIED TYPE: ICD-10-CM

## 2023-12-28 LAB — INTERNATIONAL NORMALIZATION RATIO, POC: 1.9

## 2023-12-28 PROCEDURE — 85610 PROTHROMBIN TIME: CPT

## 2023-12-28 PROCEDURE — 99211 OFF/OP EST MAY X REQ PHY/QHP: CPT

## 2023-12-28 NOTE — PROGRESS NOTES
Ms. Nico Alberts is a 80 y.o. y/o female with history of Afib who presents today for anticoagulation monitoring and adjustment. INR 1.9 is subtherapeutic for this patient (goal range 2.0-3.0) and is reflective of 17.5 mg TWD  Patient verifies current dosing regimen, patient able to verbally recall dose  Patient reports 0  missed doses since last INR   Patient denies s/sx clotting and/or stroke  Patient denies hematuria, epistaxis, rectal bleeding  Patient denies changes in diet, alcohol, or tobacco use  Reviewed medication list and drug allergies with patient, updated any medication additions or modifications accordingly  Patient also denies any pending medical or dental procedures scheduled at this time  INR has been subtherapeutic the last several visits. TWD has been being titrated up to accommodate for this. Will increase again this visit. Patient was instructed to boost today and tomorrow only by taking 3.75mg and then to begin new regimen of 3.75mg Fri, 2.5mg all other days (7.1% increase TWD). RTC 5 weeks (patient preference).     For Pharmacy Admin Tracking Only    Intervention Detail: Adherence Monitorin, Dose Adjustment: 1, reason: Therapy Optimization, and Lab(s) Ordered  Total # of Interventions Recommended: 2  Total # of Interventions Accepted: 2  Time Spent (min): 15    Mario Wu PharmD, BCPS  2023 9:25 AM

## 2023-12-29 PROBLEM — M54.50 LOW BACK PAIN: Status: ACTIVE | Noted: 2023-12-29

## 2023-12-29 PROBLEM — F32.A DEPRESSION: Status: ACTIVE | Noted: 2023-12-29

## 2023-12-29 PROBLEM — H04.123 DRY EYE SYNDROME OF BILATERAL LACRIMAL GLANDS: Status: ACTIVE | Noted: 2017-09-29

## 2023-12-29 PROBLEM — J45.909 INTRINSIC ASTHMA (HHS-HCC): Status: ACTIVE | Noted: 2023-12-29

## 2023-12-29 PROBLEM — H40.9 GLAUCOMA: Status: ACTIVE | Noted: 2023-12-29

## 2023-12-29 PROBLEM — R00.0 TACHYCARDIA: Status: ACTIVE | Noted: 2023-12-29

## 2023-12-29 PROBLEM — R26.81 UNSTEADY GAIT: Status: ACTIVE | Noted: 2023-12-29

## 2023-12-29 PROBLEM — R53.1 WEAKNESS: Status: ACTIVE | Noted: 2023-12-29

## 2023-12-29 PROBLEM — H43.813 POSTERIOR VITREOUS DETACHMENT OF BOTH EYES: Status: ACTIVE | Noted: 2018-03-29

## 2023-12-29 PROBLEM — I49.5 SICK SINUS SYNDROME (MULTI): Status: ACTIVE | Noted: 2023-12-29

## 2023-12-29 PROBLEM — J69.0 PNEUMONIA, ASPIRATION (MULTI): Status: ACTIVE | Noted: 2023-12-29

## 2023-12-29 PROBLEM — H90.3 ASYMMETRIC SNHL (SENSORINEURAL HEARING LOSS): Status: ACTIVE | Noted: 2023-12-29

## 2023-12-29 PROBLEM — R06.02 SHORTNESS OF BREATH: Status: ACTIVE | Noted: 2023-12-29

## 2023-12-29 PROBLEM — E78.5 HYPERLIPIDEMIA: Status: ACTIVE | Noted: 2023-12-29

## 2023-12-29 PROBLEM — H61.20 CERUMEN IMPACTION: Status: ACTIVE | Noted: 2023-12-29

## 2023-12-29 PROBLEM — Z99.89 USE OF CANE AS AMBULATORY AID: Status: ACTIVE | Noted: 2023-12-29

## 2023-12-29 PROBLEM — I48.91 ATRIAL FIBRILLATION (MULTI): Status: ACTIVE | Noted: 2023-12-29

## 2023-12-29 PROBLEM — I27.20 PULMONARY HYPERTENSION (MULTI): Status: ACTIVE | Noted: 2023-12-29

## 2023-12-29 PROBLEM — I10 HYPERTENSION: Status: ACTIVE | Noted: 2023-12-29

## 2023-12-29 PROBLEM — Z95.0 PACEMAKER: Status: ACTIVE | Noted: 2023-12-29

## 2023-12-29 PROBLEM — I48.11 LONGSTANDING PERSISTENT ATRIAL FIBRILLATION (MULTI): Status: ACTIVE | Noted: 2023-12-29

## 2023-12-29 PROBLEM — G56.03 BILATERAL CARPAL TUNNEL SYNDROME: Status: ACTIVE | Noted: 2023-12-29

## 2023-12-29 PROBLEM — M19.90 OSTEOARTHRITIS: Status: ACTIVE | Noted: 2023-12-29

## 2023-12-29 RX ORDER — BIMATOPROST 0.3 MG/ML
1 SOLUTION/ DROPS OPHTHALMIC
COMMUNITY
Start: 2022-10-31

## 2023-12-29 RX ORDER — LATANOPROST 50 UG/ML
SOLUTION/ DROPS OPHTHALMIC
COMMUNITY
Start: 2021-09-03 | End: 2024-01-18 | Stop reason: WASHOUT

## 2023-12-29 RX ORDER — PROPAFENONE HYDROCHLORIDE 225 MG/1
TABLET, COATED ORAL
COMMUNITY
Start: 2007-10-02 | End: 2024-01-18 | Stop reason: WASHOUT

## 2023-12-29 RX ORDER — VERAPAMIL HYDROCHLORIDE 120 MG/1
TABLET, FILM COATED, EXTENDED RELEASE ORAL
Qty: 90 TABLET | Refills: 3 | Status: SHIPPED | OUTPATIENT
Start: 2023-12-29

## 2023-12-29 RX ORDER — POLYETHYLENE GLYCOL 3350 17 G/17G
17 POWDER, FOR SOLUTION ORAL EVERY 12 HOURS
COMMUNITY
Start: 2021-05-22

## 2023-12-29 RX ORDER — BETAXOLOL HYDROCHLORIDE 2.8 MG/ML
SUSPENSION/ DROPS OPHTHALMIC
COMMUNITY
Start: 2014-11-25 | End: 2024-01-18 | Stop reason: WASHOUT

## 2023-12-29 RX ORDER — PRAVASTATIN SODIUM 40 MG/1
1 TABLET ORAL DAILY
COMMUNITY
Start: 2021-05-22 | End: 2024-01-09 | Stop reason: SDUPTHER

## 2023-12-29 RX ORDER — DOCUSATE SODIUM 100 MG/1
1 CAPSULE, LIQUID FILLED ORAL DAILY
COMMUNITY
End: 2024-03-28 | Stop reason: WASHOUT

## 2023-12-29 RX ORDER — LORAZEPAM 0.5 MG/1
TABLET ORAL
COMMUNITY
End: 2024-01-18 | Stop reason: WASHOUT

## 2023-12-29 RX ORDER — AMIODARONE HYDROCHLORIDE 100 MG/1
200 TABLET ORAL
COMMUNITY
End: 2024-01-18 | Stop reason: WASHOUT

## 2023-12-29 RX ORDER — UBIDECARENONE 75 MG
1000 CAPSULE ORAL DAILY
COMMUNITY

## 2023-12-29 RX ORDER — AMIODARONE HYDROCHLORIDE 200 MG/1
1 TABLET ORAL DAILY
COMMUNITY
Start: 2022-09-30 | End: 2024-01-18 | Stop reason: WASHOUT

## 2023-12-29 RX ORDER — VERAPAMIL HYDROCHLORIDE 240 MG/1
TABLET, FILM COATED, EXTENDED RELEASE ORAL
Qty: 90 TABLET | Refills: 3 | Status: SHIPPED | OUTPATIENT
Start: 2023-12-29

## 2023-12-29 RX ORDER — ACETAMINOPHEN 500 MG
TABLET ORAL
COMMUNITY

## 2023-12-29 RX ORDER — LISINOPRIL 20 MG/1
20 TABLET ORAL
COMMUNITY
End: 2024-01-18 | Stop reason: WASHOUT

## 2023-12-29 RX ORDER — TIMOLOL MALEATE 5 MG/ML
1 SOLUTION/ DROPS OPHTHALMIC
COMMUNITY
Start: 2021-05-18 | End: 2024-03-28 | Stop reason: WASHOUT

## 2024-01-09 DIAGNOSIS — E78.5 HYPERLIPIDEMIA, UNSPECIFIED HYPERLIPIDEMIA TYPE: Primary | ICD-10-CM

## 2024-01-09 RX ORDER — PRAVASTATIN SODIUM 40 MG/1
40 TABLET ORAL DAILY
Qty: 90 TABLET | Refills: 1 | Status: SHIPPED | OUTPATIENT
Start: 2024-01-09 | End: 2024-04-10 | Stop reason: SDUPTHER

## 2024-01-09 NOTE — TELEPHONE ENCOUNTER
Visit date last  9/14/2023    [facility] - [place], [place] - [address]  [address]  [facility] [address]  Phone: [phone] Fax: [phone]    Next office visit 3/14/2024

## 2024-01-18 ENCOUNTER — OFFICE VISIT (OUTPATIENT)
Dept: CARDIOLOGY | Facility: CLINIC | Age: 89
End: 2024-01-18
Payer: MEDICARE

## 2024-01-18 ENCOUNTER — HOSPITAL ENCOUNTER (OUTPATIENT)
Dept: CARDIOLOGY | Facility: HOSPITAL | Age: 89
Discharge: HOME | End: 2024-01-18
Payer: MEDICARE

## 2024-01-18 VITALS
WEIGHT: 138 LBS | HEART RATE: 72 BPM | SYSTOLIC BLOOD PRESSURE: 124 MMHG | HEIGHT: 66 IN | DIASTOLIC BLOOD PRESSURE: 62 MMHG | BODY MASS INDEX: 22.18 KG/M2

## 2024-01-18 DIAGNOSIS — I48.11 LONGSTANDING PERSISTENT ATRIAL FIBRILLATION (MULTI): ICD-10-CM

## 2024-01-18 DIAGNOSIS — I49.5 SICK SINUS SYNDROME (MULTI): ICD-10-CM

## 2024-01-18 DIAGNOSIS — I10 PRIMARY HYPERTENSION: ICD-10-CM

## 2024-01-18 DIAGNOSIS — I48.0 PAROXYSMAL ATRIAL FIBRILLATION (MULTI): ICD-10-CM

## 2024-01-18 DIAGNOSIS — Z95.0 PACEMAKER: Primary | ICD-10-CM

## 2024-01-18 DIAGNOSIS — I27.20 PULMONARY HYPERTENSION (MULTI): ICD-10-CM

## 2024-01-18 DIAGNOSIS — Z95.0 PACEMAKER: ICD-10-CM

## 2024-01-18 DIAGNOSIS — R06.02 SHORTNESS OF BREATH: ICD-10-CM

## 2024-01-18 DIAGNOSIS — E78.2 MIXED HYPERLIPIDEMIA: ICD-10-CM

## 2024-01-18 PROBLEM — I48.91 ATRIAL FIBRILLATION (MULTI): Status: RESOLVED | Noted: 2023-12-29 | Resolved: 2024-01-18

## 2024-01-18 PROBLEM — R00.0 TACHYCARDIA: Status: RESOLVED | Noted: 2023-12-29 | Resolved: 2024-01-18

## 2024-01-18 PROCEDURE — 3078F DIAST BP <80 MM HG: CPT | Performed by: NURSE PRACTITIONER

## 2024-01-18 PROCEDURE — 93280 PM DEVICE PROGR EVAL DUAL: CPT | Performed by: INTERNAL MEDICINE

## 2024-01-18 PROCEDURE — 3074F SYST BP LT 130 MM HG: CPT | Performed by: NURSE PRACTITIONER

## 2024-01-18 PROCEDURE — 93280 PM DEVICE PROGR EVAL DUAL: CPT

## 2024-01-18 PROCEDURE — 1036F TOBACCO NON-USER: CPT | Performed by: NURSE PRACTITIONER

## 2024-01-18 PROCEDURE — 1159F MED LIST DOCD IN RCRD: CPT | Performed by: NURSE PRACTITIONER

## 2024-01-18 PROCEDURE — 93290 INTERROG DEV EVAL ICPMS IP: CPT | Performed by: INTERNAL MEDICINE

## 2024-01-18 PROCEDURE — 99214 OFFICE O/P EST MOD 30 MIN: CPT | Performed by: NURSE PRACTITIONER

## 2024-01-18 RX ORDER — ASPIRIN 81 MG/1
81 TABLET ORAL DAILY
COMMUNITY

## 2024-01-18 RX ORDER — WARFARIN 2.5 MG/1
2.5 TABLET ORAL
COMMUNITY

## 2024-01-18 NOTE — PROGRESS NOTES
"CARDIOLOGY OFFICE VISIT      CHIEF COMPLAINT  No chief complaint on file.    Chief complaint: \"I do not seem to have much energy.\"  HISTORY OF PRESENT ILLNESS  HPI  History: The patient is an 89-year-old  female who is followed for sinus node dysfunction status post dual-chamber pacemaker implant on September 15, 2017 and persistent atrial fibrillation now current atrial fibrillation on rate control strategy with verapamil and anticoagulated with warfarin.  She has a history of hypertension, dyslipidemia, and remote lacunar infarcts in the right parietal lobe white matter per MRI of the brain dated February 10, 2022.  Additionally she is followed for pulmonary hypertension with a right ventricular systolic pressure of 53.7 mmHg per 2D echo dated June 26, 2020.  She states that she is more easily fatigued.  She admits that she has not physically active.  She states that she has also been dealing with a large stye on her right eyelid.  She states she has been applying warm compresses and the size is half of what it had been.  She is concerned regarding risk of infection.  She denies fever, chills, eye drainage, or erythema.  She reports there is a small scab over the stye.  She is scheduled for follow-up with her ophthalmologist.  Patient is accompanied by her son for today's office visit.  She denies chest pain, palpitations, dizziness, lightheadedness, near or jose syncope, shortness of breath, or abdominal distention.  She states that she does occasionally note some swelling in the lower extremities when she eats a diet higher in sodium.    Past Medical History  Past Medical History:   Diagnosis Date    Anesthesia of skin 06/02/2021    Numbness and tingling    Essential (primary) hypertension 06/05/2021    HTN (hypertension), benign    Impacted cerumen, bilateral 02/19/2018    Bilateral impacted cerumen    Other malaise 06/05/2021    Physical deconditioning    Personal history of other (healed) physical " injury and trauma 06/05/2021    History of contusion    Personal history of other diseases of the circulatory system 06/05/2021    History of atrial fibrillation    Personal history of other diseases of the digestive system 11/16/2021    History of gastroesophageal reflux (GERD)    Personal history of other diseases of the musculoskeletal system and connective tissue 06/05/2021    History of arthritis    Personal history of other diseases of the nervous system and sense organs 11/09/2021    History of carpal tunnel syndrome    Personal history of other medical treatment     History of screening mammography    Personal history of other medical treatment     History of screening mammography    Personal history of other medical treatment     History of screening mammography    Personal history of other medical treatment 11/16/2021    History of echocardiogram    Personal history of other specified conditions 06/05/2021    History of balance disorder    Unspecified abnormalities of breathing 06/02/2021    Breathing problem    Unspecified fall, initial encounter 06/05/2021    Fall, accidental    Unspecified fracture of the lower end of unspecified radius, initial encounter for closed fracture 06/05/2021    Distal radius fracture    Unspecified visual loss 06/02/2021    Vision problems       Social History  Social History     Tobacco Use    Smoking status: Former     Types: Cigarettes    Smokeless tobacco: Never   Substance Use Topics    Alcohol use: Not Currently    Drug use: Never       Family History     Family History   Problem Relation Name Age of Onset    Coronary artery disease Mother      Stroke Mother      Diabetes Mother      Coronary artery disease Father      Alcohol abuse Brother      Brain cancer Brother          Allergies:  Allergies   Allergen Reactions    Ciprofloxacin Anaphylaxis and Shortness of breath    Ketorolac Anaphylaxis    Brimonidine Itching    Dorzolamide Itching and Swelling    Hydralazine  Other and Unknown    Hydrochlorothiazide Other     hyponatremia    Sulfamethoxazole-Trimethoprim Unknown and Other     dizzy    Latex Hives and Rash    Prednisone Other and Palpitations     Hypertension; Tachycardia        Outpatient Medications:  Current Outpatient Medications   Medication Instructions    amiodarone (Pacerone) 200 mg tablet 1 tablet, oral, Daily    amiodarone (PACERONE) 200 mg, oral, Daily RT    betaxolol (Betoptic S) 0.25 % ophthalmic suspension     bimatoprost (Lumigan) 0.03 % ophthalmic solution 1 drop, ophthalmic (eye), Daily RT    cholecalciferol (Vitamin D-3) 50 mcg (2,000 unit) capsule oral    cyanocobalamin (VITAMIN B-12) 1,000 mcg, oral, Daily    docusate sodium (Colace) 100 mg capsule 1 capsule, oral, Daily    latanoprost (Xalatan) 0.005 % ophthalmic solution ophthalmic (eye), Daily RT    lisinopril 20 mg, oral, Daily RT    LORazepam (Ativan) 0.5 mg tablet oral    polyethylene glycol (GLYCOLAX, MIRALAX) 17 g, oral, Every 12 hours    pravastatin (PRAVACHOL) 40 mg, oral, Daily    propafenone (Rythmol) 225 mg tablet oral    timolol (Timoptic) 0.5 % ophthalmic solution 1 drop, ophthalmic (eye)    verapamil SR (Calan-SR) 120 mg ER tablet TAKE 1 TABLET AT NIGHT, USING 240 MG TABLET IN THE MORNING    verapamil SR (Calan-SR) 240 mg ER tablet TAKE 1 TABLET DAILY IN THE MORNING, TAKES 120 MG TABLET AT NIGHT          REVIEW OF SYSTEMS  Review of Systems   All other systems reviewed and are negative.        VITALS  There were no vitals filed for this visit.    PHYSICAL EXAM  Vitals and nursing note reviewed.   Constitutional:       Appearance: Normal appearance.   HENT:      Head: Normocephalic.  Small stye is noted on the left upper eyelid which is reddened and without drainage.  No conjunctival erythema or drainage.  Neck:      Vascular: No JVD.   Cardiovascular:      Rate and Rhythm: Normal rate and regular rhythm.      Pulses: Normal pulses.      Heart sounds: Normal heart sounds.   Pulmonary:       Effort: Pulmonary effort is normal.      Breath sounds: Normal breath sounds.   Abdominal:      General: Bowel sounds are normal.      Palpations: Abdomen is soft.   Musculoskeletal:         General: Normal range of motion.      Cervical back: Normal range of motion.   Skin:     General: Skin is warm and dry.  Left subclavian pacemaker pocket is well-healed without redness swelling or drainage.  Neurological:      General: No focal deficit present.      Mental Status: She is alert and oriented to person, place, and time.      Motor: Motor function is intact.   Psychiatric:         Attention and Perception: Attention and perception normal.         Mood and Affect: Mood and affect normal.         Speech: Speech normal.         Behavior: Behavior normal. Behavior is cooperative.         Thought Content: Thought content normal.         Cognition and Memory: Cognition and memory normal.     Labs and testing: Twelve-lead EKG reveals atrial fibrillation with ventricular pacing at 72 bpm.  QRS durations 142 ms,  ms, QTc 516 ms.  Pacemaker interrogation dated January 18, 2024 reveals ventricular pacing 91.78%.  No ventricular arrhythmic events are noted.  Good sensing and capture thresholds are noted.  Estimated battery longevity is 2 years.  2D echocardiogram dated August 17, 2023 revealed an ejection fraction of 50 to 55%, moderate mitral valve thickening, 1+ tricuspid regurgitation, pulmonary hypertension with right ventricular systolic pressure 37 mmHg, and left atrial enlargement the left atrial size of 4.2 cm.      ASSESSMENT AND PLAN       Clinical impressions:  1. Sinus node dysfunction status post dual-chamber pacemaker implant ([employer] advisor DR CHRISTINA) on September 15, 2017.  2. Permanent atrial fibrillation on rate control strategy with verapamil and anticoagulated with warfarin.  Failure of amiodarone to control the arrhythmia.    3. High risk medication.  Long-term anticoagulation.  4. History of  hyperkalemia.  5. Hypertension, controlled with a blood pressure today of 124/62.  6. Dyslipidemia on statin.  7. Shortness of breath with exertion.  8. Normal left ventricular function per 2D echo dated February 10, 2022 with an ejection fraction of 60%.  9. Valvular heart disease consisting of moderate mitral valve thickening and 1+ tricuspid regurgitation per 2D echocardiogram dated August 17, 2023.    10. Pulmonary hypertension with right ventricular systolic pressure 37 per 2D echocardiogram dated August 17, 2023.    11. MRI of the brain dated February 10, 2022 revealing acute/subacute lacunar infarctions in the right parietal lobe white matter.  12. Carotid stenosis consisting of 40 to 50% left internal carotid artery and 50 to 60% distal left internal carotid artery stenosis and 20 to 30% right internal carotid artery stenosis.    Recommendations:  1.  Continue current medications as prescribed.  2.  Obtain a remote device interrogation on April 22, 2024 and in clinic check in 6 months prior to the office visit with Dr. Pichardo.  3.  Follow-up in office with Dr. Pichardo in 6 months or sooner if needed.  4.  Continue to apply the warm compresses to the left eye.  Patient states that she will be following up with her ophthalmologist next week.  Signs of infection were discussed.  5.  Continue lifestyle modifications as discussed.    Evaluation and note by Mariia Schilling CNP  **Please excuse any errors in grammar or translation related to this dictation.  Voice recognition software was utilized to prepare this document.**

## 2024-02-01 ENCOUNTER — HOSPITAL ENCOUNTER (OUTPATIENT)
Dept: PHARMACY | Age: 89
Setting detail: THERAPIES SERIES
Discharge: HOME | End: 2024-02-01
Payer: MEDICARE

## 2024-02-01 DIAGNOSIS — I48.91 ATRIAL FIBRILLATION, UNSPECIFIED TYPE (HCC): Primary | ICD-10-CM

## 2024-02-01 LAB — INTERNATIONAL NORMALIZATION RATIO, POC: 2.4

## 2024-02-01 PROCEDURE — 99211 OFF/OP EST MAY X REQ PHY/QHP: CPT

## 2024-02-01 PROCEDURE — 85610 PROTHROMBIN TIME: CPT

## 2024-02-01 NOTE — PROGRESS NOTES
Ms. [place] Osei is a 89 y.o. y/o female with history of Afib who presents today for anticoagulation monitoring and adjustment.  INR 2.4 is therapeutic for this patient (goal range 2-3) and is reflective of 18.75 mg TWD  Patient verifies current dosing regimen, patient able to verbally recall dose  Patient reports 0  missed doses since last INR   Patient denies s/sx clotting and/or stroke  Patient denies hematuria, epistaxis, rectal bleeding  Patient denies changes in diet, alcohol, or tobacco use  Reviewed medication list and drug allergies with patient, updated any medication additions or modifications accordingly  Patient also denies any pending medical or dental procedures scheduled at this time  Patient was instructed to continue 18.75 mg TWD and RTC 6 weeks    Pt prefers to eat more fruits and vegetables - considering another increase in Warfarin dose so she can stay within her INR range.  Revisit at next AMS appt    For Pharmacy Admin Tracking Only    Intervention Detail: Adherence Monitorin  Total # of Interventions Recommended: 1  Total # of Interventions Accepted: 1  Time Spent (min): 15

## 2024-02-02 DIAGNOSIS — I48.91 ATRIAL FIBRILLATION, UNSPECIFIED TYPE (HCC): Primary | ICD-10-CM

## 2024-02-02 RX ORDER — WARFARIN SODIUM 2.5 MG/1
TABLET ORAL
Qty: 90 TABLET | Refills: 1 | Status: SHIPPED | OUTPATIENT
Start: 2024-02-02

## 2024-02-16 ENCOUNTER — TELEPHONE (OUTPATIENT)
Dept: PRIMARY CARE | Facility: CLINIC | Age: 89
End: 2024-02-16
Payer: MEDICARE

## 2024-02-16 DIAGNOSIS — E78.2 MIXED HYPERLIPIDEMIA: ICD-10-CM

## 2024-02-16 DIAGNOSIS — I10 PRIMARY HYPERTENSION: ICD-10-CM

## 2024-02-16 NOTE — TELEPHONE ENCOUNTER
Patient called office today asking if she is due for any labs. For her upcoming appointment in March, In the old system there is a CMP and Lipid orders that were placed in September 2023.   Those orders are pended. Is there anything else that needs to be placed?   Once signed, Patient is requesting orders to be mailed to her.

## 2024-03-07 ENCOUNTER — HOSPITAL ENCOUNTER (OUTPATIENT)
Dept: PHARMACY | Age: 89
Setting detail: THERAPIES SERIES
Discharge: HOME | End: 2024-03-07
Payer: MEDICARE

## 2024-03-07 DIAGNOSIS — I48.20 CHRONIC ATRIAL FIBRILLATION (HCC): Primary | ICD-10-CM

## 2024-03-07 LAB — INTERNATIONAL NORMALIZATION RATIO, POC: 2.5

## 2024-03-07 PROCEDURE — 99211 OFF/OP EST MAY X REQ PHY/QHP: CPT | Performed by: PHARMACIST

## 2024-03-07 PROCEDURE — 85610 PROTHROMBIN TIME: CPT | Performed by: PHARMACIST

## 2024-03-07 NOTE — PROGRESS NOTES
Ms. India Osei is a 89 y.o. y/o female with history of Afib who presents today for anticoagulation monitoring and adjustment.  INR 2.5 is therapeutic for this patient (goal range 2-3) and is reflective of 18.75 mg TWD  Patient verifies current dosing regimen, patient able to verbally recall dose  Patient reports no  missed doses since last INR   Patient denies s/sx clotting and/or stroke  Patient denies hematuria, epistaxis, rectal bleeding  Patient denies changes in diet, alcohol, or tobacco use  Reviewed medication list and drug allergies with patient, updated any medication additions or modifications accordingly  Patient also denies any pending medical or dental procedures scheduled at this time  Patient was instructed to continue 18.75mg TWD and RTC 6 weeks      For Pharmacy Admin Tracking Only    Intervention Detail: Adherence Monitorin  Total # of Interventions Recommended: 1  Total # of Interventions Accepted: 1  Time Spent (min): 15

## 2024-03-28 ENCOUNTER — OFFICE VISIT (OUTPATIENT)
Dept: PRIMARY CARE | Facility: CLINIC | Age: 89
End: 2024-03-28
Payer: MEDICARE

## 2024-03-28 ENCOUNTER — HOSPITAL ENCOUNTER (OUTPATIENT)
Dept: LAB | Age: 89
Discharge: HOME | End: 2024-03-28
Payer: MEDICARE

## 2024-03-28 VITALS
OXYGEN SATURATION: 98 % | HEIGHT: 66 IN | BODY MASS INDEX: 22.06 KG/M2 | SYSTOLIC BLOOD PRESSURE: 124 MMHG | WEIGHT: 137.25 LBS | DIASTOLIC BLOOD PRESSURE: 70 MMHG | HEART RATE: 76 BPM | TEMPERATURE: 97.2 F

## 2024-03-28 DIAGNOSIS — I48.11 LONGSTANDING PERSISTENT ATRIAL FIBRILLATION (MULTI): ICD-10-CM

## 2024-03-28 DIAGNOSIS — I49.5 SICK SINUS SYNDROME (MULTI): ICD-10-CM

## 2024-03-28 DIAGNOSIS — I10 PRIMARY HYPERTENSION: ICD-10-CM

## 2024-03-28 DIAGNOSIS — R06.02 SHORTNESS OF BREATH: ICD-10-CM

## 2024-03-28 DIAGNOSIS — M54.50 LOW BACK PAIN, UNSPECIFIED BACK PAIN LATERALITY, UNSPECIFIED CHRONICITY, UNSPECIFIED WHETHER SCIATICA PRESENT: ICD-10-CM

## 2024-03-28 DIAGNOSIS — E78.2 MIXED HYPERLIPIDEMIA: Primary | ICD-10-CM

## 2024-03-28 DIAGNOSIS — J45.909 INTRINSIC ASTHMA (HHS-HCC): ICD-10-CM

## 2024-03-28 DIAGNOSIS — M19.90 OSTEOARTHRITIS, UNSPECIFIED OSTEOARTHRITIS TYPE, UNSPECIFIED SITE: ICD-10-CM

## 2024-03-28 LAB
ALBUMIN SERPL-MCNC: 4.2 G/DL (ref 3.5–4.6)
ALP SERPL-CCNC: 105 U/L (ref 40–130)
ALT SERPL-CCNC: 23 U/L (ref 0–33)
ANION GAP SERPL CALCULATED.3IONS-SCNC: 12 MEQ/L (ref 9–15)
AST SERPL-CCNC: 27 U/L (ref 0–35)
BILIRUB SERPL-MCNC: 0.7 MG/DL (ref 0.2–0.7)
BUN SERPL-MCNC: 25 MG/DL (ref 8–23)
CALCIUM SERPL-MCNC: 9.7 MG/DL (ref 8.5–9.9)
CHLORIDE SERPL-SCNC: 102 MEQ/L (ref 95–107)
CHOLEST SERPL-MCNC: 169 MG/DL (ref 0–199)
CO2 SERPL-SCNC: 26 MEQ/L (ref 20–31)
CREAT SERPL-MCNC: 0.91 MG/DL (ref 0.5–0.9)
GLOBULIN SER CALC-MCNC: 3.3 G/DL (ref 2.3–3.5)
GLUCOSE SERPL-MCNC: 128 MG/DL (ref 70–99)
HDLC SERPL-MCNC: 71 MG/DL (ref 40–59)
LDLC SERPL CALC-MCNC: 79 MG/DL (ref 0–129)
POTASSIUM SERPL-SCNC: 4.6 MEQ/L (ref 3.4–4.9)
PROT SERPL-MCNC: 7.5 G/DL (ref 6.3–8)
SODIUM SERPL-SCNC: 140 MEQ/L (ref 135–144)
TRIGL SERPL-MCNC: 96 MG/DL (ref 0–150)

## 2024-03-28 PROCEDURE — 1159F MED LIST DOCD IN RCRD: CPT | Performed by: FAMILY MEDICINE

## 2024-03-28 PROCEDURE — 1036F TOBACCO NON-USER: CPT | Performed by: FAMILY MEDICINE

## 2024-03-28 PROCEDURE — 99214 OFFICE O/P EST MOD 30 MIN: CPT | Performed by: FAMILY MEDICINE

## 2024-03-28 PROCEDURE — 80053 COMPREHEN METABOLIC PANEL: CPT

## 2024-03-28 PROCEDURE — 36415 COLL VENOUS BLD VENIPUNCTURE: CPT

## 2024-03-28 PROCEDURE — 80061 LIPID PANEL: CPT

## 2024-03-28 PROCEDURE — 1160F RVW MEDS BY RX/DR IN RCRD: CPT | Performed by: FAMILY MEDICINE

## 2024-03-28 PROCEDURE — 3078F DIAST BP <80 MM HG: CPT | Performed by: FAMILY MEDICINE

## 2024-03-28 PROCEDURE — 3074F SYST BP LT 130 MM HG: CPT | Performed by: FAMILY MEDICINE

## 2024-03-28 RX ORDER — BETAXOLOL HYDROCHLORIDE 2.8 MG/ML
1 SUSPENSION/ DROPS OPHTHALMIC DAILY
COMMUNITY

## 2024-03-28 ASSESSMENT — PATIENT HEALTH QUESTIONNAIRE - PHQ9
1. LITTLE INTEREST OR PLEASURE IN DOING THINGS: NOT AT ALL
SUM OF ALL RESPONSES TO PHQ9 QUESTIONS 1 AND 2: 0
2. FEELING DOWN, DEPRESSED OR HOPELESS: NOT AT ALL

## 2024-03-28 NOTE — PROGRESS NOTES
"Subjective   Chief complaint: Cecilia Yee is a 89 y.o. female who presents for Med Management (Patient in office today for med management. ).    HPI:  HPI  Chronic disease management.  Active problems noted Tetrex.  Chronic A-fib.  No shortness of breath above baseline.  She does have pulmonary hypertension occasional shortness of breath sick sinus syndrome pacemaker.  No chest pain.  No recent fevers chills.  Stays physically active.  Ambulates with a cane.  Laboratory assessments been ordered.  Vital signs are normal.  Vaccinations are reviewed.  Medications are reconciled.  We will plan on a 6-month follow-up.  Labs in the interim.  Objective   /70 (BP Location: Left arm, Patient Position: Sitting, BP Cuff Size: Adult)   Pulse 76   Temp 36.2 °C (97.2 °F) (Temporal)   Ht 1.676 m (5' 6\")   Wt 62.3 kg (137 lb 4 oz)   SpO2 98%   BMI 22.15 kg/m²   Physical Exam  Vitals and nursing note reviewed.   Constitutional:       Appearance: Normal appearance.   HENT:      Head: Normocephalic and atraumatic.   Eyes:      Extraocular Movements: Extraocular movements intact.      Conjunctiva/sclera: Conjunctivae normal.      Pupils: Pupils are equal, round, and reactive to light.   Cardiovascular:      Rate and Rhythm: Normal rate and regular rhythm.      Heart sounds: Normal heart sounds.   Pulmonary:      Effort: Pulmonary effort is normal.      Breath sounds: Normal breath sounds.   Abdominal:      General: Abdomen is flat. Bowel sounds are normal.      Palpations: Abdomen is soft.   Musculoskeletal:         General: Normal range of motion.   Skin:     General: Skin is warm and dry.   Neurological:      General: No focal deficit present.      Mental Status: She is alert and oriented to person, place, and time. Mental status is at baseline.   Psychiatric:         Mood and Affect: Mood normal.         Behavior: Behavior normal.       Review of Systems   I have reviewed and reconciled the medication list with " the patient today.   Current Outpatient Medications:     aspirin 81 mg EC tablet, Take 1 tablet (81 mg) by mouth once daily., Disp: , Rfl:     betaxolol (Betoptic S) 0.25 % ophthalmic suspension, Administer 1 drop into both eyes once daily., Disp: , Rfl:     bimatoprost (Lumigan) 0.03 % ophthalmic solution, Administer 1 drop into affected eye(s) once daily., Disp: , Rfl:     cholecalciferol (Vitamin D-3) 50 mcg (2,000 unit) capsule, Take by mouth., Disp: , Rfl:     cyanocobalamin (Vitamin B-12) 500 mcg tablet, Take 2 tablets (1,000 mcg) by mouth once daily., Disp: , Rfl:     polyethylene glycol (Glycolax, Miralax) 17 gram/dose powder, Take 17 g by mouth every 12 hours., Disp: , Rfl:     pravastatin (Pravachol) 40 mg tablet, Take 1 tablet (40 mg) by mouth once daily., Disp: 90 tablet, Rfl: 1    verapamil SR (Calan-SR) 120 mg ER tablet, TAKE 1 TABLET AT NIGHT, USING 240 MG TABLET IN THE MORNING, Disp: 90 tablet, Rfl: 3    verapamil SR (Calan-SR) 240 mg ER tablet, TAKE 1 TABLET DAILY IN THE MORNING, TAKES 120 MG TABLET AT NIGHT, Disp: 90 tablet, Rfl: 3    warfarin (Coumadin) 2.5 mg tablet, Take 1 tablet (2.5 mg) by mouth. Take as directed by the [facility] coumadin clinic, Disp: , Rfl:      Imaging:  No results found.     Labs reviewed:    Lab Results   Component Value Date    WBC 7.2 11/01/2022    HGB 11.7 (L) 11/01/2022    HCT 38.1 11/01/2022     11/01/2022    CHOL 162 11/30/2022    TRIG 82 11/30/2022    HDL 68.3 11/30/2022    ALT 15 11/30/2022    AST 15 11/30/2022     12/06/2022    K 3.9 12/06/2022     12/06/2022    CREATININE 1.11 (H) 12/06/2022    BUN 19 12/06/2022    CO2 28 12/06/2022    TSH 5.45 (H) 11/01/2022    INR 3.3 (H) 12/06/2022    HGBA1C 5.8 (A) 02/10/2022       Assessment/Plan   Problem List Items Addressed This Visit             ICD-10-CM    Hyperlipidemia - Primary E78.5    Relevant Orders    Comprehensive metabolic panel    Lipid panel    Hypertension I10    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

## 2024-04-08 DIAGNOSIS — E78.5 HYPERLIPIDEMIA, UNSPECIFIED HYPERLIPIDEMIA TYPE: ICD-10-CM

## 2024-04-10 RX ORDER — PRAVASTATIN SODIUM 40 MG/1
TABLET ORAL
Qty: 90 TABLET | Refills: 3 | Status: SHIPPED | OUTPATIENT
Start: 2024-04-10

## 2024-04-18 ENCOUNTER — HOSPITAL ENCOUNTER (OUTPATIENT)
Dept: PHARMACY | Age: 89
Setting detail: THERAPIES SERIES
Discharge: HOME | End: 2024-04-18
Payer: MEDICARE

## 2024-04-18 DIAGNOSIS — I48.91 ATRIAL FIBRILLATION, UNSPECIFIED TYPE (HCC): Primary | ICD-10-CM

## 2024-04-18 LAB — INTERNATIONAL NORMALIZATION RATIO, POC: 3

## 2024-04-18 PROCEDURE — 99211 OFF/OP EST MAY X REQ PHY/QHP: CPT

## 2024-04-18 PROCEDURE — 85610 PROTHROMBIN TIME: CPT

## 2024-04-18 NOTE — PROGRESS NOTES
Ms. India Osei is a 89 y.o. y/o female with history of Afib who presents today for anticoagulation monitoring and adjustment.  INR 3.0 is therapeutic for this patient (goal range 2-3) and is reflective of 18.75 mg TWD  Patient verifies current dosing regimen, patient able to verbally recall dose  Patient reports 0  missed doses since last INR   Patient denies s/sx clotting and/or stroke  Patient denies hematuria, epistaxis, rectal bleeding  Patient denies changes in diet, alcohol, or tobacco use  Reviewed medication list and drug allergies with patient, updated any medication additions or modifications accordingly  Patient also denies any pending medical or dental procedures scheduled at this time  Patient was instructed to continue 18.75 mg TWD and RTC 6 weeks      For Pharmacy Admin Tracking Only    Intervention Detail: Adherence Monitorin  Total # of Interventions Recommended: 1  Total # of Interventions Accepted: 1  Time Spent (min): 15

## 2024-04-22 ENCOUNTER — HOSPITAL ENCOUNTER (OUTPATIENT)
Dept: CARDIOLOGY | Facility: HOSPITAL | Age: 89
Discharge: HOME | End: 2024-04-22
Payer: MEDICARE

## 2024-04-22 DIAGNOSIS — Z95.0 CARDIAC PACEMAKER: ICD-10-CM

## 2024-04-22 DIAGNOSIS — I49.5 SINOATRIAL NODE DYSFUNCTION (MULTI): ICD-10-CM

## 2024-04-22 PROCEDURE — 93294 REM INTERROG EVL PM/LDLS PM: CPT | Performed by: INTERNAL MEDICINE

## 2024-04-22 PROCEDURE — 93296 REM INTERROG EVL PM/IDS: CPT

## 2024-05-30 ENCOUNTER — HOSPITAL ENCOUNTER (OUTPATIENT)
Dept: PHARMACY | Age: 89
Setting detail: THERAPIES SERIES
Discharge: HOME | End: 2024-05-30
Payer: MEDICARE

## 2024-05-30 DIAGNOSIS — I48.0 PAROXYSMAL ATRIAL FIBRILLATION (HCC): Primary | ICD-10-CM

## 2024-05-30 LAB — INTERNATIONAL NORMALIZATION RATIO, POC: 2.3

## 2024-05-30 PROCEDURE — 99211 OFF/OP EST MAY X REQ PHY/QHP: CPT

## 2024-05-30 PROCEDURE — 85610 PROTHROMBIN TIME: CPT

## 2024-05-30 NOTE — PROGRESS NOTES
Ms. India Osei is a 89 y.o. y/o female with history of Afib who presents today for anticoagulation monitoring and adjustment.  INR 2.3 is therapeutic for this patient (goal range 2-3) and is reflective of 18.75 mg TWD  Patient verifies current dosing regimen, patient able to verbally recall dose  Patient reports 0  missed doses since last INR   Patient denies s/sx clotting and/or stroke  Patient denies hematuria, epistaxis, rectal bleeding  Patient denies changes in diet, alcohol, or tobacco use  Reviewed medication list and drug allergies with patient, updated any medication additions or modifications accordingly  Patient also denies any pending medical or dental procedures scheduled at this time  Patient was instructed to continue 18.75 mg TWD and RTC 6 weeks  For Pharmacy Admin Tracking Only    Intervention Detail: Adherence Monitorin  Total # of Interventions Recommended: 1  Total # of Interventions Accepted: 1  Time Spent (min): 15

## 2024-07-11 ENCOUNTER — HOSPITAL ENCOUNTER (OUTPATIENT)
Dept: PHARMACY | Age: 89
Setting detail: THERAPIES SERIES
Discharge: HOME | End: 2024-07-11
Payer: MEDICARE

## 2024-07-11 DIAGNOSIS — I48.91 ATRIAL FIBRILLATION, UNSPECIFIED TYPE (HCC): Primary | ICD-10-CM

## 2024-07-11 LAB
INTERNATIONAL NORMALIZATION RATIO, POC: 1.7
PROTHROMBIN TIME, POC: NORMAL

## 2024-07-11 PROCEDURE — 85610 PROTHROMBIN TIME: CPT

## 2024-07-11 PROCEDURE — 99213 OFFICE O/P EST LOW 20 MIN: CPT

## 2024-07-11 RX ORDER — WARFARIN SODIUM 2.5 MG/1
TABLET ORAL
Qty: 90 TABLET | Refills: 1 | Status: SHIPPED | OUTPATIENT
Start: 2024-07-11

## 2024-07-11 NOTE — PROGRESS NOTES
Ms. India Osei is a 89 y.o. y/o female with history of Afib who presents today for anticoagulation monitoring and adjustment.    INR 1.7 is subtherapeutic for this patient (goal range 2-3) and is reflective of 18.75 mg TWD    Patient verifies current dosing regimen, patient able to verbally recall dose  Patient reports 0 missed doses since last INR   Patient denies s/sx clotting and/or stroke  Patient denies hematuria, epistaxis, rectal bleeding  Patient denies changes in diet, alcohol, or tobacco use  Reviewed medication list and drug allergies with patient, updated any medication additions or modifications accordingly  Patient also denies any pending medical or dental procedures scheduled at this time    Patient is typically very stable on this dose. No discernible reason for low INR today    Refill sent to [facility]     Patient was instructed to boost with 3.75 mg TWD and then resume 18.75 mg TWD and RTC 6 weeks    Winsome Arce, ShaiD, BCPS  2024 9:13 AM      For Pharmacy Admin Tracking Only    Intervention Detail: Adherence Monitorin, Dose Adjustment: 1, reason: Therapy Optimization, and Refill(s) Provided  Total # of Interventions Recommended: 1  Total # of Interventions Accepted: 1  Time Spent (min): 20

## 2024-07-12 ENCOUNTER — HOSPITAL ENCOUNTER (OUTPATIENT)
Dept: CARDIOLOGY | Facility: HOSPITAL | Age: 89
Discharge: HOME | End: 2024-07-12
Payer: MEDICARE

## 2024-07-12 ENCOUNTER — APPOINTMENT (OUTPATIENT)
Dept: CARDIOLOGY | Facility: CLINIC | Age: 89
End: 2024-07-12
Payer: MEDICARE

## 2024-07-12 VITALS
WEIGHT: 132.3 LBS | SYSTOLIC BLOOD PRESSURE: 112 MMHG | BODY MASS INDEX: 21.26 KG/M2 | DIASTOLIC BLOOD PRESSURE: 66 MMHG | HEIGHT: 66 IN | HEART RATE: 75 BPM

## 2024-07-12 DIAGNOSIS — I48.0 PAROXYSMAL ATRIAL FIBRILLATION (MULTI): ICD-10-CM

## 2024-07-12 DIAGNOSIS — I48.11 LONGSTANDING PERSISTENT ATRIAL FIBRILLATION (MULTI): ICD-10-CM

## 2024-07-12 DIAGNOSIS — Z95.0 CARDIAC PACEMAKER IN SITU: Primary | ICD-10-CM

## 2024-07-12 DIAGNOSIS — Z87.891 FORMER SMOKER: ICD-10-CM

## 2024-07-12 DIAGNOSIS — I49.5 SICK SINUS SYNDROME (MULTI): ICD-10-CM

## 2024-07-12 DIAGNOSIS — Z95.0 PACEMAKER: ICD-10-CM

## 2024-07-12 PROCEDURE — 93280 PM DEVICE PROGR EVAL DUAL: CPT

## 2024-07-12 PROCEDURE — 3078F DIAST BP <80 MM HG: CPT | Performed by: INTERNAL MEDICINE

## 2024-07-12 PROCEDURE — 1159F MED LIST DOCD IN RCRD: CPT | Performed by: INTERNAL MEDICINE

## 2024-07-12 PROCEDURE — 99214 OFFICE O/P EST MOD 30 MIN: CPT | Performed by: INTERNAL MEDICINE

## 2024-07-12 PROCEDURE — 93280 PM DEVICE PROGR EVAL DUAL: CPT | Performed by: INTERNAL MEDICINE

## 2024-07-12 PROCEDURE — 3074F SYST BP LT 130 MM HG: CPT | Performed by: INTERNAL MEDICINE

## 2024-07-12 PROCEDURE — 1036F TOBACCO NON-USER: CPT | Performed by: INTERNAL MEDICINE

## 2024-07-12 PROCEDURE — 93000 ELECTROCARDIOGRAM COMPLETE: CPT | Mod: DISTINCT PROCEDURAL SERVICE | Performed by: INTERNAL MEDICINE

## 2024-07-12 ASSESSMENT — ENCOUNTER SYMPTOMS
RESPIRATORY NEGATIVE: 1
IRREGULAR HEARTBEAT: 1
CONSTITUTIONAL NEGATIVE: 1
NEUROLOGICAL NEGATIVE: 1

## 2024-07-12 NOTE — PROGRESS NOTES
CARDIOLOGY OFFICE VISIT      CHIEF COMPLAINT  Chief Complaint   Patient presents with    Follow-up     6 mos       HISTORY OF PRESENT ILLNESS  HPI  89-year-old  female who is followed for sinus node dysfunction status post dual-chamber pacemaker implant on September 15, 2017 and persistent atrial fibrillation on amiodarone, verapamil, and anticoagulated with warfarin. She underwent an outpatient cardioversion on December 6, 2022 and presents to the office today for follow-up evaluation. Her INRs are monitored through the [facility]. 2D echocardiogram dated August 17, 2023 revealed an ejection fraction of 50 to 55%, moderate mitral valve thickening, 1+ tricuspid regurgitation, pulmonary hypertension with right ventricular systolic pressure 37 mmHg, and left atrial enlargement the left atrial size of 4.2 cm.    Patient states that lately she has been doing well.  The only concern is regards to some edema in the lower extremities but improved usually in the morning or after rest.    Looking at her records, patient was using amiodarone.  This medication is not anymore in her current list.    EKG performed today shows atrial fibrillation ventricular paced rhythm at a rate of 75 bpm QRS ration 146 ms QT corrected 515 ms.  Rhythm strip shows the same pattern.    Patient had a device interrogation performed today at the device clinic and it shows dual-chamber pacemaker with battery longevity 1 year (Medtronic device).  [place] of atrial pressure 99% of the time.  No high ventricular events noted.         Past Medical History  Past Medical History:   Diagnosis Date    Anesthesia of skin 06/02/2021    Numbness and tingling    Essential (primary) hypertension 06/05/2021    HTN (hypertension), benign    Impacted cerumen, bilateral 02/19/2018    Bilateral impacted cerumen    Other malaise 06/05/2021    Physical deconditioning    Personal history of other (healed) physical injury and trauma 06/05/2021     History of contusion    Personal history of other diseases of the circulatory system 06/05/2021    History of atrial fibrillation    Personal history of other diseases of the digestive system 11/16/2021    History of gastroesophageal reflux (GERD)    Personal history of other diseases of the musculoskeletal system and connective tissue 06/05/2021    History of arthritis    Personal history of other diseases of the nervous system and sense organs 11/09/2021    History of carpal tunnel syndrome    Personal history of other medical treatment     History of screening mammography    Personal history of other medical treatment     History of screening mammography    Personal history of other medical treatment     History of screening mammography    Personal history of other medical treatment 11/16/2021    History of echocardiogram    Personal history of other specified conditions 06/05/2021    History of balance disorder    Unspecified abnormalities of breathing 06/02/2021    Breathing problem    Unspecified fall, initial encounter 06/05/2021    Fall, accidental    Unspecified fracture of the lower end of unspecified radius, initial encounter for closed fracture 06/05/2021    Distal radius fracture    Unspecified visual loss 06/02/2021    Vision problems       Social History  Social History     Tobacco Use    Smoking status: Former     Types: Cigarettes    Smokeless tobacco: Never   Substance Use Topics    Alcohol use: Never    Drug use: Never       Family History     Family History   Problem Relation Name Age of Onset    Coronary artery disease Mother      Stroke Mother      Diabetes Mother      Coronary artery disease Father      Alcohol abuse Brother      Brain cancer Brother          Allergies:  Allergies   Allergen Reactions    Ciprofloxacin Anaphylaxis and Shortness of breath    Ketorolac Anaphylaxis    Brimonidine Itching    Dorzolamide Itching and Swelling    Hydralazine Other and Unknown    Hydrochlorothiazide  Other     hyponatremia    Sulfamethoxazole-Trimethoprim Unknown and Other     dizzy    Latex Hives and Rash    Prednisone Other and Palpitations     Hypertension; Tachycardia        Outpatient Medications:  Current Outpatient Medications   Medication Instructions    aspirin 81 mg, oral, Daily    betaxolol (Betoptic S) 0.25 % ophthalmic suspension 1 drop, Both Eyes, Daily    bimatoprost (Lumigan) 0.03 % ophthalmic solution 1 drop, ophthalmic (eye), Daily RT    cholecalciferol (Vitamin D-3) 50 mcg (2,000 unit) capsule oral    polyethylene glycol (GLYCOLAX, MIRALAX) 17 g, oral, Every 12 hours    pravastatin (Pravachol) 40 mg tablet Take 1 tablet by mouth daily    verapamil SR (Calan-SR) 120 mg ER tablet TAKE 1 TABLET AT NIGHT, USING 240 MG TABLET IN THE MORNING    verapamil SR (Calan-SR) 240 mg ER tablet TAKE 1 TABLET DAILY IN THE MORNING, TAKES 120 MG TABLET AT NIGHT    warfarin (COUMADIN) 2.5 mg, oral, Take as directed by the [facility] coumadin clinic          REVIEW OF SYSTEMS  Review of Systems   Constitutional: Negative.   Cardiovascular:  Positive for irregular heartbeat.   Respiratory: Negative.     Neurological: Negative.          VITALS  Vitals:    07/12/24 1418   BP: 112/66   Pulse: 75       PHYSICAL EXAM  Constitutional:       Appearance: Healthy appearance. Not in distress.   Eyes:      Conjunctiva/sclera: Conjunctivae normal.      Pupils: Pupils are equal, round, and reactive to light.   Neck:      Vascular: No JVR. JVD normal.   Pulmonary:      Effort: Pulmonary effort is normal.      Breath sounds: Normal breath sounds. No wheezing. No rhonchi. No rales.   Chest:      Chest wall: Not tender to palpatation.   Cardiovascular:      PMI at left midclavicular line. Normal rate. Irregularly irregular rhythm. Normal S1. Normal S2.       Murmurs: There is no murmur.      No gallop.  No click. No rub.   Pulses:     Intact distal pulses.   Edema:     Peripheral edema absent.   Abdominal:      Tenderness: There is no  abdominal tenderness.   Musculoskeletal: Normal range of motion.         General: No tenderness.      Cervical back: Normal range of motion. Skin:     General: Skin is warm and dry.   Neurological:      General: No focal deficit present.      Mental Status: Alert and oriented to person, place and time.           ASSESSMENT AND PLAN  Clinical impressions:  1. 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  2. Persistent atrial fibrillation with failure of sotalol to control the atrial fibrillation presently on a combination of amiodarone and verapamil and anticoagulated with warfarin with twelve-lead EKG today revealing persistent atrial fibrillation..  3. High risk medication.  4. History of hyperkalemia.  5. Hypertension, controlled   6. Dyslipidemia on statin.  7. Shortness of breath with exertion.  8. Normal left ventricular function per 2D echo dated February 10, 2022 with an ejection fraction of 60%.  9. Valvular heart disease consisting of mild aortic valve cusp calcification, mild mitral annular calcification, mild to moderate MR and mild TR per 2D echocardiogram dated February 10, 2022.  10. Pulmonary hypertension with right ventricular systolic pressure 53.7 mmHg per 2D echocardiogram dated June 26, 2020.  11. MRI of the brain dated February 10, 2022 revealing acute/subacute lacunar infarctions in the right parietal lobe white matter.  12. Carotid stenosis consisting of 40 to 50% left internal carotid artery and 50 to 60% distal left internal carotid artery stenosis and 20 to 30% right internal carotid artery stenosis.    Plan recommendations    I had a lengthy discussion with patient and family member regarding plan to follow for management of atrial fibrillation.  Patient has been off amiodarone for certain amount of time.  Unknown reasons.  Will discontinue amiodarone completely.  Will continue rate control strategy for atrial  fibrillation.  She is doing well.  She is asymptomatic.    Follow with device clinic as scheduled.    Continue with anticoagulant therapy with warfarin.    Follow my office every 6 months or sooner if needed.    Risk factor modification and lifestyle modification discussed with patient. Diet , exercise and hydration discussed with patient.    I have personally review with patient during this office visit, laboratory data, echocardiogram results, stress test results, Holter-event monitor results prior and after the last electrophysiology visit. All questions has been answered.    Please excuse any errors in grammar or translation related to this dictation.  Voice recognition software was utilized to prepare this document.

## 2024-07-17 ENCOUNTER — APPOINTMENT (OUTPATIENT)
Dept: CARDIOLOGY | Facility: HOSPITAL | Age: 89
End: 2024-07-17
Payer: MEDICARE

## 2024-07-17 ENCOUNTER — APPOINTMENT (OUTPATIENT)
Dept: CARDIOLOGY | Facility: CLINIC | Age: 89
End: 2024-07-17
Payer: MEDICARE

## 2024-08-22 ENCOUNTER — ANTI-COAG VISIT (OUTPATIENT)
Age: 89
End: 2024-08-22
Payer: MEDICARE

## 2024-08-22 DIAGNOSIS — I48.91 ATRIAL FIBRILLATION, UNSPECIFIED TYPE (HCC): Primary | ICD-10-CM

## 2024-08-22 PROCEDURE — 99213 OFFICE O/P EST LOW 20 MIN: CPT

## 2024-08-22 PROCEDURE — 85610 PROTHROMBIN TIME: CPT

## 2024-08-22 NOTE — PROGRESS NOTES
Ms. India Osei is a 89 y.o. y/o female with history of  Afib who presents today for anticoagulation monitoring and adjustment.    INR 1.9 is sub-therapeutic for this patient (goal range 2.0 - 3.0) and is reflective of 18.75 mg TWD    Patient verifies current dosing regimen, patient able to verbally recall dose  Patient reports 0  missed doses since last INR   Patient denies s/sx clotting and/or stroke  Patient denies hematuria, epistaxis, rectal bleeding  Patient denies changes in diet, alcohol, or tobacco use  Reviewed medication list and drug allergies with patient, denies any medication additions or modifications   Patient also denies any pending medical or dental procedures scheduled at this time    Patient was instructed to take 3.75mg as a boost then take 3.75mg on M, and 2.5mg all other days, 20mg TWD (6.7% increase) and RTC 4 weeks.    Pt was very reluctant to come back to the clinic sooner, and 4 wks was the soonest that willing be back.     Chet Hills, PharmD  PGY-1 Pharmacy Resident  [facility]      2024 9:15 AM      For Pharmacy Admin Tracking Only    Intervention Detail: Adherence Monitorin and Dose Adjustment: 1, reason: Therapy Optimization  Total # of Interventions Recommended: 3  Total # of Interventions Accepted: 3  Time Spent (min): 20

## 2024-09-19 ENCOUNTER — ANTI-COAG VISIT (OUTPATIENT)
Age: 89
End: 2024-09-19
Payer: MEDICARE

## 2024-09-19 DIAGNOSIS — I48.91 ATRIAL FIBRILLATION, UNSPECIFIED TYPE (HCC): Primary | ICD-10-CM

## 2024-09-19 LAB
INTERNATIONAL NORMALIZATION RATIO, POC: 2.4
PROTHROMBIN TIME, POC: 0

## 2024-09-19 PROCEDURE — 99211 OFF/OP EST MAY X REQ PHY/QHP: CPT

## 2024-09-19 PROCEDURE — 85610 PROTHROMBIN TIME: CPT

## 2024-09-26 ENCOUNTER — HOSPITAL ENCOUNTER (OUTPATIENT)
Dept: LAB | Age: 89
Discharge: HOME | End: 2024-09-26
Payer: MEDICARE

## 2024-09-26 ENCOUNTER — APPOINTMENT (OUTPATIENT)
Dept: PRIMARY CARE | Facility: CLINIC | Age: 89
End: 2024-09-26
Payer: MEDICARE

## 2024-09-26 VITALS
OXYGEN SATURATION: 98 % | HEIGHT: 65 IN | WEIGHT: 133.8 LBS | SYSTOLIC BLOOD PRESSURE: 132 MMHG | BODY MASS INDEX: 22.29 KG/M2 | DIASTOLIC BLOOD PRESSURE: 80 MMHG | TEMPERATURE: 98 F | HEART RATE: 70 BPM

## 2024-09-26 DIAGNOSIS — R20.0 NUMBNESS AND TINGLING OF BOTH FEET: ICD-10-CM

## 2024-09-26 DIAGNOSIS — E78.2 MIXED HYPERLIPIDEMIA: Primary | ICD-10-CM

## 2024-09-26 DIAGNOSIS — J45.909 INTRINSIC ASTHMA (HHS-HCC): ICD-10-CM

## 2024-09-26 DIAGNOSIS — R20.2 NUMBNESS AND TINGLING OF BOTH FEET: ICD-10-CM

## 2024-09-26 DIAGNOSIS — I48.11 LONGSTANDING PERSISTENT ATRIAL FIBRILLATION (MULTI): ICD-10-CM

## 2024-09-26 DIAGNOSIS — M19.90 OSTEOARTHRITIS, UNSPECIFIED OSTEOARTHRITIS TYPE, UNSPECIFIED SITE: ICD-10-CM

## 2024-09-26 DIAGNOSIS — R06.02 SHORTNESS OF BREATH: ICD-10-CM

## 2024-09-26 DIAGNOSIS — R20.2 BILATERAL LEG PARESTHESIA: ICD-10-CM

## 2024-09-26 DIAGNOSIS — I27.20 PULMONARY HYPERTENSION (MULTI): ICD-10-CM

## 2024-09-26 DIAGNOSIS — R73.9 HYPERGLYCEMIA: ICD-10-CM

## 2024-09-26 DIAGNOSIS — I10 PRIMARY HYPERTENSION: ICD-10-CM

## 2024-09-26 LAB
ALBUMIN SERPL-MCNC: 4.1 G/DL (ref 3.5–4.6)
ALP SERPL-CCNC: 117 U/L (ref 40–130)
ALT SERPL-CCNC: 20 U/L (ref 0–33)
ANION GAP SERPL CALCULATED.3IONS-SCNC: 11 MEQ/L (ref 9–15)
AST SERPL-CCNC: 34 U/L (ref 0–35)
BILIRUB SERPL-MCNC: 0.7 MG/DL (ref 0.2–0.7)
BUN SERPL-MCNC: 18 MG/DL (ref 8–23)
CALCIUM SERPL-MCNC: 9.8 MG/DL (ref 8.5–9.9)
CHLORIDE SERPL-SCNC: 99 MEQ/L (ref 95–107)
CHOLEST SERPL-MCNC: 164 MG/DL (ref 0–199)
CO2 SERPL-SCNC: 27 MEQ/L (ref 20–31)
CREAT SERPL-MCNC: 0.83 MG/DL (ref 0.5–0.9)
GLOBULIN SER CALC-MCNC: 3.3 G/DL (ref 2.3–3.5)
GLUCOSE SERPL-MCNC: 118 MG/DL (ref 70–99)
HDLC SERPL-MCNC: 67 MG/DL (ref 40–59)
LDLC SERPL CALC-MCNC: 81 MG/DL (ref 0–129)
POTASSIUM SERPL-SCNC: 4.3 MEQ/L (ref 3.4–4.9)
PROT SERPL-MCNC: 7.4 G/DL (ref 6.3–8)
SODIUM SERPL-SCNC: 137 MEQ/L (ref 135–144)
TRIGL SERPL-MCNC: 79 MG/DL (ref 0–150)

## 2024-09-26 PROCEDURE — 3075F SYST BP GE 130 - 139MM HG: CPT | Performed by: FAMILY MEDICINE

## 2024-09-26 PROCEDURE — 1124F ACP DISCUSS-NO DSCNMKR DOCD: CPT | Performed by: FAMILY MEDICINE

## 2024-09-26 PROCEDURE — 80053 COMPREHEN METABOLIC PANEL: CPT

## 2024-09-26 PROCEDURE — 1036F TOBACCO NON-USER: CPT | Performed by: FAMILY MEDICINE

## 2024-09-26 PROCEDURE — 80061 LIPID PANEL: CPT

## 2024-09-26 PROCEDURE — 3079F DIAST BP 80-89 MM HG: CPT | Performed by: FAMILY MEDICINE

## 2024-09-26 PROCEDURE — 1159F MED LIST DOCD IN RCRD: CPT | Performed by: FAMILY MEDICINE

## 2024-09-26 PROCEDURE — 99214 OFFICE O/P EST MOD 30 MIN: CPT | Performed by: FAMILY MEDICINE

## 2024-09-26 PROCEDURE — 83036 HEMOGLOBIN GLYCOSYLATED A1C: CPT

## 2024-09-26 PROCEDURE — 36415 COLL VENOUS BLD VENIPUNCTURE: CPT

## 2024-09-26 ASSESSMENT — PATIENT HEALTH QUESTIONNAIRE - PHQ9
10. IF YOU CHECKED OFF ANY PROBLEMS, HOW DIFFICULT HAVE THESE PROBLEMS MADE IT FOR YOU TO DO YOUR WORK, TAKE CARE OF THINGS AT HOME, OR GET ALONG WITH OTHER PEOPLE: SOMEWHAT DIFFICULT
SUM OF ALL RESPONSES TO PHQ9 QUESTIONS 1 AND 2: 2
1. LITTLE INTEREST OR PLEASURE IN DOING THINGS: SEVERAL DAYS
2. FEELING DOWN, DEPRESSED OR HOPELESS: SEVERAL DAYS

## 2024-09-26 NOTE — PROGRESS NOTES
"Subjective   Chief complaint: Cecilia Yee is a 89 y.o. female who presents for 6 month Follow-up.    HPI:  HPI  Chronic disease management.  Accompanied by her son.  Complains of paresthesias.  Numbness in her feet.  Tingling.  Pins needles.  Up through her shins.  Will do EMG.  Also complains of difficulty with shortness of breath.  Seems to be getting worse.  History of atrial fibrillation.  Follows with electrophysiology.  She is on anticoagulation.  However she describes more of a chest tight.  Wheeze.  She has a bronchodilator inhaler.  She has not had any recent pulmonary function testing.  Will proceed.  Also due for laboratory assessment.  Ordered today.  Previous labs reviewed.  Hyperglycemia.  Discussed diabetes.  Reduction of sweets.  Check A1c and repeat blood sugar.  Also had renal insufficiency.  Likely prerenal.  Will repeat today.  Will plan on a regular 6-month follow-up or sooner pending clinical course and test results.  Objective   /80 (BP Location: Left arm, Patient Position: Sitting)   Pulse 70   Temp 36.7 °C (98 °F)   Ht 1.651 m (5' 5\")   Wt 60.7 kg (133 lb 12.8 oz)   SpO2 98%   BMI 22.27 kg/m²   Physical Exam  Vitals and nursing note reviewed.   Constitutional:       Appearance: Normal appearance.   HENT:      Head: Normocephalic and atraumatic.   Eyes:      Extraocular Movements: Extraocular movements intact.      Conjunctiva/sclera: Conjunctivae normal.      Pupils: Pupils are equal, round, and reactive to light.   Cardiovascular:      Rate and Rhythm: Normal rate and regular rhythm.      Heart sounds: Normal heart sounds.   Pulmonary:      Effort: Pulmonary effort is normal.      Breath sounds: Normal breath sounds.   Abdominal:      General: Abdomen is flat. Bowel sounds are normal.      Palpations: Abdomen is soft.   Musculoskeletal:         General: Normal range of motion.   Skin:     General: Skin is warm and dry.   Neurological:      General: No focal deficit " present.      Mental Status: She is alert and oriented to person, place, and time. Mental status is at baseline.   Psychiatric:         Mood and Affect: Mood normal.         Behavior: Behavior normal.       Review of Systems   I have reviewed and reconciled the medication list with the patient today.   Current Outpatient Medications:     aspirin 81 mg EC tablet, Take 1 tablet (81 mg) by mouth once daily., Disp: , Rfl:     betaxolol (Betoptic S) 0.25 % ophthalmic suspension, Administer 1 drop into both eyes once daily., Disp: , Rfl:     bimatoprost (Lumigan) 0.03 % ophthalmic solution, Administer 1 drop into affected eye(s) once daily., Disp: , Rfl:     cholecalciferol (Vitamin D-3) 50 mcg (2,000 unit) capsule, Take 1 capsule (50 mcg) by mouth early in the morning.., Disp: , Rfl:     polyethylene glycol (Glycolax, Miralax) 17 gram/dose powder, Take 17 g by mouth every 12 hours., Disp: , Rfl:     pravastatin (Pravachol) 40 mg tablet, Take 1 tablet by mouth daily, Disp: 90 tablet, Rfl: 3    verapamil SR (Calan-SR) 120 mg ER tablet, TAKE 1 TABLET AT NIGHT, USING 240 MG TABLET IN THE MORNING, Disp: 90 tablet, Rfl: 3    verapamil SR (Calan-SR) 240 mg ER tablet, TAKE 1 TABLET DAILY IN THE MORNING, TAKES 120 MG TABLET AT NIGHT, Disp: 90 tablet, Rfl: 3    warfarin (Coumadin) 2.5 mg tablet, Take 1 tablet (2.5 mg) by mouth once daily. Take as directed by the [facility] coumadin clinic, Disp: , Rfl:      Imaging:  No results found.     Labs reviewed:    Lab Results   Component Value Date    WBC 7.2 11/01/2022    HGB 11.7 (L) 11/01/2022    HCT 38.1 11/01/2022     11/01/2022    CHOL 162 11/30/2022    TRIG 82 11/30/2022    HDL 68.3 11/30/2022    ALT 15 11/30/2022    AST 15 11/30/2022     12/06/2022    K 3.9 12/06/2022     12/06/2022    CREATININE 1.11 (H) 12/06/2022    BUN 19 12/06/2022    CO2 28 12/06/2022    TSH 5.45 (H) 11/01/2022    INR 3.3 (H) 12/06/2022    HGBA1C 5.8 (A) 02/10/2022       Assessment/Plan    Problem List Items Addressed This Visit             ICD-10-CM    Hyperlipidemia - Primary E78.5    Relevant Orders    Comprehensive metabolic panel    Lipid panel    Hypertension I10    Relevant Orders    Comprehensive metabolic panel    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    Longstanding persistent atrial fibrillation (Multi) I48.11    Osteoarthritis M19.90    Pulmonary hypertension (Multi) I27.20    Relevant Orders    Complete Pulmonary Function Test Pre/Post Bronchodialator (Spirometry Pre/Post/DLCO/Lung Volumes)    Shortness of breath R06.02    Relevant Orders    Complete Pulmonary Function Test Pre/Post Bronchodialator (Spirometry Pre/Post/DLCO/Lung Volumes)     Other Visit Diagnoses         Codes    Hyperglycemia     R73.9    Relevant Orders    Hemoglobin A1c    EMG & nerve conduction    Bilateral leg paresthesia     R20.2    Relevant Orders    EMG & nerve conduction    Numbness and tingling of both feet     R20.0, R20.2    Relevant Orders    EMG & nerve conduction            Reinforced lifestyle modifications  Continue current medications as listed  Physical activity as tolerated and healthy diet encouraged  Maintain a healthy weight  Follow up in  6mo

## 2024-09-27 LAB
ESTIMATED AVERAGE GLUCOSE: 103 MG/DL
HBA1C MFR BLD: 5.2 % (ref 4–6)

## 2024-10-02 ENCOUNTER — TELEPHONE (OUTPATIENT)
Dept: PRIMARY CARE | Facility: CLINIC | Age: 89
End: 2024-10-02
Payer: MEDICARE

## 2024-10-02 NOTE — TELEPHONE ENCOUNTER
Patient calling for lab results done at [facility] 9/26/24, results in chart.  Please review and advise of orders.

## 2024-10-02 NOTE — TELEPHONE ENCOUNTER
Call placed to patient, no answer, left a detailed message advising of results per physician, advised to call the office if any further questions.

## 2024-10-03 ENCOUNTER — HOSPITAL ENCOUNTER (OUTPATIENT)
Dept: RESPIRATORY THERAPY | Facility: HOSPITAL | Age: 89
Discharge: HOME | End: 2024-10-03
Payer: MEDICARE

## 2024-10-03 DIAGNOSIS — I27.20 PULMONARY HYPERTENSION (MULTI): ICD-10-CM

## 2024-10-03 DIAGNOSIS — R06.02 SHORTNESS OF BREATH: ICD-10-CM

## 2024-10-03 LAB
MGC ASCENT PFT - FEV1 - POST: 1.05
MGC ASCENT PFT - FEV1 - PRE: 1.1
MGC ASCENT PFT - FEV1 - PREDICTED: 1.84
MGC ASCENT PFT - FVC - POST: 1.67
MGC ASCENT PFT - FVC - PRE: 1.79
MGC ASCENT PFT - FVC - PREDICTED: 2.49

## 2024-10-03 PROCEDURE — 94726 PLETHYSMOGRAPHY LUNG VOLUMES: CPT

## 2024-10-15 DIAGNOSIS — I48.91 ATRIAL FIBRILLATION, UNSPECIFIED TYPE (HCC): Primary | ICD-10-CM

## 2024-10-22 ENCOUNTER — HOSPITAL ENCOUNTER (OUTPATIENT)
Dept: CARDIOLOGY | Facility: HOSPITAL | Age: 89
Discharge: HOME | End: 2024-10-22
Payer: MEDICARE

## 2024-10-22 DIAGNOSIS — I49.5 SICK SINUS SYNDROME (MULTI): ICD-10-CM

## 2024-10-22 DIAGNOSIS — Z95.0 PACEMAKER: ICD-10-CM

## 2024-10-22 DIAGNOSIS — I48.11 LONGSTANDING PERSISTENT ATRIAL FIBRILLATION (MULTI): ICD-10-CM

## 2024-10-22 PROCEDURE — 93294 REM INTERROG EVL PM/LDLS PM: CPT | Performed by: INTERNAL MEDICINE

## 2024-10-22 PROCEDURE — 93296 REM INTERROG EVL PM/IDS: CPT

## 2024-10-31 ENCOUNTER — ANTI-COAG VISIT (OUTPATIENT)
Age: 89
End: 2024-10-31
Payer: MEDICARE

## 2024-10-31 ENCOUNTER — TELEPHONE (OUTPATIENT)
Age: 89
End: 2024-10-31

## 2024-10-31 DIAGNOSIS — I48.91 ATRIAL FIBRILLATION, UNSPECIFIED TYPE (HCC): Primary | ICD-10-CM

## 2024-10-31 LAB
INTERNATIONAL NORMALIZATION RATIO, POC: 1.9
PROTHROMBIN TIME, POC: 0

## 2024-10-31 PROCEDURE — 85610 PROTHROMBIN TIME: CPT

## 2024-10-31 PROCEDURE — 99212 OFFICE O/P EST SF 10 MIN: CPT

## 2024-10-31 RX ORDER — WARFARIN SODIUM 2.5 MG/1
TABLET ORAL
Qty: 120 TABLET | Refills: 1 | OUTPATIENT
Start: 2024-10-31

## 2024-10-31 NOTE — TELEPHONE ENCOUNTER
Patient had an appointment today and was unsure if she needed a refill. Called the clinic after leaving her appointment to request a refill of the warfarin 2.5 mg tablets. Prescription phoned-in to [facility] (now [facility])  
Detail Level: Detailed
Xray Shoulder 2 Views, Left

## 2024-10-31 NOTE — PROGRESS NOTES
Ms. India Osei is a 89 y.o. y/o female with history of Afib who presents today for anticoagulation monitoring and adjustment.  INR 1.9 is sub-therapeutic for this patient (goal range 2.0-3.0) and is reflective of 20 mg TWD  Patient verifies current dosing regimen, patient able to verbally recall dose  Patient reports 0 missed doses since last INR   Patient denies s/sx clotting and/or stroke  Patient denies hematuria, epistaxis, rectal bleeding  Patient denies changes in alcohol or tobacco use - Patient reports less green vegetables in delivered meals lately so patient has been adding green beans/corn  Reviewed medication list and drug allergies with patient, updated any medication additions or modifications accordingly  Patient also denies any pending medical or dental procedures scheduled at this time  Patient was instructed to take boost 3.75 mg today then resume warfarin 2.5 mg daily except 3.75 mg MF (30 mg TWD) and RTC 6 weeks    Mariaa Enrique, PharmD   For Pharmacy Admin Tracking Only    Intervention Detail: Adherence Monitorin and Dose Adjustment: 1, reason: Therapy Optimization  Total # of Interventions Recommended: 2  Total # of Interventions Accepted: 2  Time Spent (min): 15

## 2024-11-13 DIAGNOSIS — I10 HYPERTENSION, UNSPECIFIED TYPE: ICD-10-CM

## 2024-11-13 RX ORDER — HYDROCHLOROTHIAZIDE 25 MG/1
TABLET ORAL
Qty: 90 TABLET | Refills: 3 | Status: SHIPPED | OUTPATIENT
Start: 2024-11-13

## 2024-11-13 RX ORDER — VERAPAMIL HCL 240 MG
TABLET, EXTENDED RELEASE ORAL
Qty: 90 TABLET | Refills: 3 | Status: SHIPPED | OUTPATIENT
Start: 2024-11-13

## 2024-11-13 NOTE — TELEPHONE ENCOUNTER
Received request for prescription refills for patient.   Patient follows with Dr. Donnie Pichardo    Request is for Verapamil 120 mg and Verapamil 240 mg   Is patient currently on medication yes    Last OV 7/12/2024  Next OV 1/22/2025    Pended for signing and sent to provider

## 2024-11-14 ENCOUNTER — HOSPITAL ENCOUNTER (OUTPATIENT)
Dept: NEUROLOGY | Facility: HOSPITAL | Age: 89
Discharge: HOME | End: 2024-11-14
Payer: MEDICARE

## 2024-11-14 DIAGNOSIS — R20.0 NUMBNESS AND TINGLING OF BOTH FEET: ICD-10-CM

## 2024-11-14 DIAGNOSIS — R20.2 NUMBNESS AND TINGLING OF BOTH FEET: ICD-10-CM

## 2024-11-14 DIAGNOSIS — R73.9 HYPERGLYCEMIA: ICD-10-CM

## 2024-11-14 DIAGNOSIS — R20.2 BILATERAL LEG PARESTHESIA: ICD-10-CM

## 2024-11-14 PROCEDURE — 95886 MUSC TEST DONE W/N TEST COMP: CPT

## 2024-12-12 ENCOUNTER — ANTI-COAG VISIT (OUTPATIENT)
Age: 88
End: 2024-12-12
Payer: MEDICARE

## 2024-12-12 DIAGNOSIS — I48.91 ATRIAL FIBRILLATION, UNSPECIFIED TYPE (HCC): Primary | ICD-10-CM

## 2024-12-12 LAB
INTERNATIONAL NORMALIZATION RATIO, POC: 2 (ref 2–3)
PROTHROMBIN TIME, POC: 0

## 2024-12-12 PROCEDURE — 85610 PROTHROMBIN TIME: CPT | Performed by: PHARMACIST

## 2024-12-12 PROCEDURE — 99211 OFF/OP EST MAY X REQ PHY/QHP: CPT | Performed by: PHARMACIST

## 2024-12-12 NOTE — PROGRESS NOTES
Ms. India Osei is a 89 y.o. y/o female with history of Afib who presents today for anticoagulation monitoring and adjustment.  INR 2.0 is therapeutic for this patient (goal range 2-3) and is reflective of 20 mg TWD  Patient verifies current dosing regimen, patient able to verbally recall dose  Patient reports NO missed doses since last INR   Patient denies s/sx clotting and/or stroke  Patient denies hematuria, epistaxis, rectal bleeding  Patient denies changes in diet, alcohol, or tobacco use  Reviewed medication list and drug allergies with patient, updated any medication additions or modifications accordingly  Patient also denies any pending medical or dental procedures scheduled at this time  Patient was instructed to continue 20 mg TWD and RTC 6 weeks    Manish Sanchez R.Ph.  2024  9:24 AM    For Pharmacy Admin Tracking Only    Intervention Detail: Adherence Monitorin  Total # of Interventions Recommended: 0  Total # of Interventions Accepted: 0  Time Spent (min): 15

## 2025-01-03 ENCOUNTER — HOSPITAL ENCOUNTER (OUTPATIENT)
Dept: CARDIOLOGY | Facility: HOSPITAL | Age: OVER 89
Discharge: HOME | End: 2025-01-03
Payer: MEDICARE

## 2025-01-03 ENCOUNTER — OFFICE VISIT (OUTPATIENT)
Dept: CARDIOLOGY | Facility: CLINIC | Age: OVER 89
End: 2025-01-03
Payer: MEDICARE

## 2025-01-03 VITALS
WEIGHT: 133.4 LBS | SYSTOLIC BLOOD PRESSURE: 102 MMHG | BODY MASS INDEX: 21.44 KG/M2 | HEIGHT: 66 IN | DIASTOLIC BLOOD PRESSURE: 54 MMHG | HEART RATE: 71 BPM

## 2025-01-03 DIAGNOSIS — R94.31 ABNORMAL EKG: ICD-10-CM

## 2025-01-03 DIAGNOSIS — I49.5 SICK SINUS SYNDROME (MULTI): ICD-10-CM

## 2025-01-03 DIAGNOSIS — I48.11 LONGSTANDING PERSISTENT ATRIAL FIBRILLATION (MULTI): ICD-10-CM

## 2025-01-03 DIAGNOSIS — Z87.891 FORMER SMOKER: ICD-10-CM

## 2025-01-03 DIAGNOSIS — Z95.0 CARDIAC PACEMAKER IN SITU: ICD-10-CM

## 2025-01-03 DIAGNOSIS — Z95.0 PACEMAKER: ICD-10-CM

## 2025-01-03 DIAGNOSIS — Z95.0 PACEMAKER: Primary | ICD-10-CM

## 2025-01-03 DIAGNOSIS — I48.21 PERMANENT ATRIAL FIBRILLATION (MULTI): ICD-10-CM

## 2025-01-03 PROBLEM — Z51.81 ANTICOAGULATION MANAGEMENT ENCOUNTER: Status: ACTIVE | Noted: 2025-01-03

## 2025-01-03 PROBLEM — Z79.01 ANTICOAGULATION MANAGEMENT ENCOUNTER: Status: ACTIVE | Noted: 2025-01-03

## 2025-01-03 PROCEDURE — 93280 PM DEVICE PROGR EVAL DUAL: CPT

## 2025-01-03 RX ORDER — LANOLIN ALCOHOL/MO/W.PET/CERES
1000 CREAM (GRAM) TOPICAL DAILY
COMMUNITY

## 2025-01-03 ASSESSMENT — ENCOUNTER SYMPTOMS
PND: 0
ORTHOPNEA: 0
NEAR-SYNCOPE: 0
SYNCOPE: 0
WHEEZING: 0
SHORTNESS OF BREATH: 0
CLAUDICATION: 0
SNORING: 0
CARDIOVASCULAR NEGATIVE: 1
COUGH: 0
IRREGULAR HEARTBEAT: 0

## 2025-01-03 NOTE — PROGRESS NOTES
CARDIOLOGY OFFICE VISIT      CHIEF COMPLAINT  Chief Complaint   Patient presents with    Follow-up     Pt is here today following up after 6 months with device check        HISTORY OF PRESENT ILLNESS  HPI  89-year-old  female who is followed for sinus node dysfunction status post dual-chamber pacemaker implant on September 15, 2017 and persistent atrial fibrillation on amiodarone, verapamil, and anticoagulated with warfarin. She underwent an outpatient cardioversion on December 6, 2022 and presents to the office today for follow-up evaluation. Her INRs are monitored through the [facility]. 2D echocardiogram dated August 17, 2023 revealed an ejection fraction of 50 to 55%, moderate mitral valve thickening, 1+ tricuspid regurgitation, pulmonary hypertension with right ventricular systolic pressure 37 mmHg, and left atrial enlargement the left atrial size of 4.2 cm    Since the last office visit, she has been doing well.  She denies any symptoms like chest pain or shortness of breath or palpitations.  During the last office visit in July 2024, amiodarone was discontinued due to persisting of atrial fibrillation.  She was left on rate control strategy.    EKG performed today shows  atrial fibrillation with ventricular paced rhythm at a rate of 71 bpm QRS duration 144 ms QT corrected 504 ms.  Rhythm strip shows the same pattern.    Patient had a device interrogation today at the device clinic and it shows dual-chamber pacemaker Medtronic with battery Ingevity 1 year 4 months.  Apex of atrial fibrillation 100%.  No ventricular arrhythmias detected.      Past Medical History  Past Medical History:   Diagnosis Date    Anesthesia of skin 06/02/2021    Numbness and tingling    Essential (primary) hypertension 06/05/2021    HTN (hypertension), benign    Impacted cerumen, bilateral 02/19/2018    Bilateral impacted cerumen    Other malaise 06/05/2021    Physical deconditioning    Personal history of other  (healed) physical injury and trauma 2021    History of contusion    Personal history of other diseases of the circulatory system 2021    History of atrial fibrillation    Personal history of other diseases of the digestive system 2021    History of gastroesophageal reflux (GERD)    Personal history of other diseases of the musculoskeletal system and connective tissue 2021    History of arthritis    Personal history of other diseases of the nervous system and sense organs 2021    History of carpal tunnel syndrome    Personal history of other medical treatment     History of screening mammography    Personal history of other medical treatment     History of screening mammography    Personal history of other medical treatment     History of screening mammography    Personal history of other medical treatment 2021    History of echocardiogram    Personal history of other specified conditions 2021    History of balance disorder    Unspecified abnormalities of breathing 2021    Breathing problem    Unspecified fall, initial encounter 2021    Fall, accidental    Unspecified fracture of the lower end of unspecified radius, initial encounter for closed fracture 2021    Distal radius fracture    Unspecified visual loss 2021    Vision problems       Social History  Social History     Tobacco Use    Smoking status: Former     Current packs/day: 0.00     Average packs/day: 0.5 packs/day for 14.0 years (7.0 ttl pk-yrs)     Types: Cigarettes     Start date:      Quit date:      Years since quittin.0     Passive exposure: Past    Smokeless tobacco: Never   Vaping Use    Vaping status: Never Used   Substance Use Topics    Alcohol use: Never    Drug use: Never       Family History     Family History   Problem Relation Name Age of Onset    Coronary artery disease Mother      Stroke Mother      Diabetes Mother      Coronary artery disease Father      Alcohol  abuse Brother      Brain cancer Brother          Allergies:  Allergies   Allergen Reactions    Ciprofloxacin Anaphylaxis and Shortness of breath    Ketorolac Anaphylaxis    Brimonidine Itching    Dorzolamide Itching and Swelling    Hydralazine Other and Unknown    Hydrochlorothiazide Other     hyponatremia    Sulfamethoxazole-Trimethoprim Unknown and Other     dizzy    Latex Hives and Rash    Prednisone Other and Palpitations     Hypertension; Tachycardia        Outpatient Medications:  Current Outpatient Medications   Medication Instructions    aspirin 81 mg, Daily    betaxolol (Betoptic S) 0.25 % ophthalmic suspension 1 drop, Daily    bimatoprost (Lumigan) 0.03 % ophthalmic solution 1 drop, Daily RT    cholecalciferol (Vitamin D-3) 50 mcg (2,000 unit) capsule 1 capsule, Daily    cyanocobalamin (VITAMIN B-12) 1,000 mcg, Daily    polyethylene glycol (GLYCOLAX, MIRALAX) 17 g, Every 12 hours    pravastatin (Pravachol) 40 mg tablet Take 1 tablet by mouth daily    verapamil SR (Calan-SR) 120 mg ER tablet TAKE 1 TABLET AT NIGHT, USING 240 MG TABLET IN THE MORNING    verapamil SR (Calan-SR) 240 mg ER tablet TAKE 1 TABLET DAILY IN THE MORNING, TAKES 120 MG TABLET AT NIGHT    warfarin (COUMADIN) 2.5 mg, Daily          REVIEW OF SYSTEMS  Review of Systems   Constitutional: Negative for malaise/fatigue.   Cardiovascular: Negative.  Negative for claudication, cyanosis, irregular heartbeat, leg swelling, near-syncope, orthopnea, paroxysmal nocturnal dyspnea and syncope.   Respiratory:  Negative for cough, shortness of breath, snoring and wheezing.    All other systems reviewed and are negative.        VITALS  Vitals:    01/03/25 1233   BP: 102/54   Pulse: 71       PHYSICAL EXAM  Constitutional:       Appearance: Normal and healthy appearance. Well-developed and not in distress.      Comments: Left sided device well healed   Neck:      Vascular: No JVR. JVD normal.   Pulmonary:      Effort: Pulmonary effort is normal.       Breath sounds: Normal breath sounds. No wheezing. No rhonchi. No rales.   Chest:      Chest wall: Not tender to palpatation.   Cardiovascular:      PMI at left midclavicular line. Normal rate. Regular rhythm. Normal S1. Normal S2.       Murmurs: There is no murmur.      No gallop.  No click. No rub.   Pulses:     Intact distal pulses.   Edema:     Peripheral edema absent.   Abdominal:      Tenderness: There is no abdominal tenderness.   Musculoskeletal: Normal range of motion.         General: No tenderness. Skin:     General: Skin is warm and dry.   Neurological:      General: No focal deficit present.      Mental Status: Alert and oriented to person, place and time.           ASSESSMENT AND PLAN  Clinical impressions:  1. Sinus node dysfunction status post dual-chamber pacemaker implant ([employer] advisor DR CHRISTINA) on September 15, 2017.  2. Persistent atrial fibrillation with failure of sotalol to control the atrial fibrillation presently on a combination of amiodarone and verapamil and anticoagulated with warfarin with twelve-lead EKG today revealing persistent atrial fibrillation.. Off amiodarone since 2023.  3. High risk medication.  4. History of hyperkalemia.  5. Hypertension, controlled   6. Dyslipidemia on statin.  7. Shortness of breath with exertion.  8. Normal left ventricular function per 2D echo dated February 10, 2022 with an ejection fraction of 60%.  9. Valvular heart disease consisting of mild aortic valve cusp calcification, mild mitral annular calcification, mild to moderate MR and mild TR per 2D echocardiogram dated February 10, 2022.  10. Pulmonary hypertension with right ventricular systolic pressure 53.7 mmHg per 2D echocardiogram dated June 26, 2020.  11. MRI of the brain dated February 10, 2022 revealing acute/subacute lacunar infarctions in the right parietal lobe white matter.  12. Carotid stenosis consisting of 40 to 50% left internal carotid artery and 50 to 60% distal left internal  carotid artery stenosis and 20 to 30% right internal carotid artery stenosis.    Plan recommendations    Patient is doing well on rate control strategy for atrial fibrillation.    Follow my office every 6 months or sooner needed.    Follow device clinic as scheduled.    Continue with warfarin therapy.    Risk factor modification and lifestyle modification discussed with patient. Diet , exercise and hydration discussed with patient.    I have personally review with patient during this office visit, laboratory data, echocardiogram results, stress test results, Holter-event monitor results prior and after the last electrophysiology visit. All questions has been answered.    Please excuse any errors in grammar or translation related to this dictation.  Voice recognition software was utilized to prepare this document.

## 2025-01-03 NOTE — PATIENT INSTRUCTIONS
Follow up in 6 months  Remote device checks will occur at 3 and 9 month intervals.  In clinic device checks are to be done every 6 months, on the same day of your appointment.  Continue same medications and treatments.   Patient educated on proper medication use.   Patient educated on risk factor modification.   Please bring any lab results from other providers / physicians to your next appointment.     Please bring all medicines, vitamins, and herbal supplements with you when you come to the office.     Prescriptions will not be filled unless you are compliant with your follow up appointments or have a follow up appointment scheduled as per instruction of your physician. Refills should be requested at the time of your visit.  IJERRY LPN, AM SCRIBING FOR, AND IN THE PRESENCE OF DR. MALLIKA AMBROSIO MD

## 2025-01-16 ENCOUNTER — APPOINTMENT (OUTPATIENT)
Dept: CARDIOLOGY | Facility: CLINIC | Age: OVER 89
End: 2025-01-16
Payer: MEDICARE

## 2025-01-16 ENCOUNTER — APPOINTMENT (OUTPATIENT)
Dept: CARDIOLOGY | Facility: HOSPITAL | Age: OVER 89
End: 2025-01-16
Payer: MEDICARE

## 2025-01-22 ENCOUNTER — APPOINTMENT (OUTPATIENT)
Dept: CARDIOLOGY | Facility: CLINIC | Age: OVER 89
End: 2025-01-22
Payer: MEDICARE

## 2025-01-22 ENCOUNTER — APPOINTMENT (OUTPATIENT)
Dept: CARDIOLOGY | Facility: HOSPITAL | Age: OVER 89
End: 2025-01-22
Payer: MEDICARE

## 2025-01-23 ENCOUNTER — ANTI-COAG VISIT (OUTPATIENT)
Age: 89
End: 2025-01-23
Payer: MEDICARE

## 2025-01-23 DIAGNOSIS — I48.0 PAROXYSMAL ATRIAL FIBRILLATION (HCC): Primary | ICD-10-CM

## 2025-01-23 LAB
INTERNATIONAL NORMALIZATION RATIO, POC: 2.1
PROTHROMBIN TIME, POC: 0

## 2025-01-23 PROCEDURE — 99211 OFF/OP EST MAY X REQ PHY/QHP: CPT

## 2025-01-23 PROCEDURE — 85610 PROTHROMBIN TIME: CPT

## 2025-01-23 NOTE — PROGRESS NOTES
Ms. India Osei is a 89 y.o. y/o female with history of Afib who presents today for anticoagulation monitoring and adjustment.  INR 2.1 is therapeutic for this patient (goal range 2-3) and is reflective of 20 mg TWD  Patient verifies current dosing regimen, patient able to verbally recall dose  Patient reports 0  missed doses since last INR   Patient denies s/sx clotting and/or stroke  Patient denies hematuria, epistaxis, rectal bleeding  Patient denies changes in diet, alcohol, or tobacco use  Reviewed medication list and drug allergies with patient, updated any medication additions or modifications accordingly  Patient also denies any pending medical or dental procedures scheduled at this time  Patient was instructed to continue 20 mg TWD and RTC 6 weeks  For Pharmacy Admin Tracking Only    Intervention Detail: Adherence Monitorin  Total # of Interventions Recommended: 1  Total # of Interventions Accepted: 1  Time Spent (min): 15

## 2025-03-06 ENCOUNTER — APPOINTMENT (OUTPATIENT)
Dept: PRIMARY CARE | Facility: CLINIC | Age: OVER 89
End: 2025-03-06
Payer: MEDICARE

## 2025-03-06 VITALS
HEIGHT: 66 IN | SYSTOLIC BLOOD PRESSURE: 136 MMHG | OXYGEN SATURATION: 96 % | BODY MASS INDEX: 20.6 KG/M2 | WEIGHT: 128.2 LBS | DIASTOLIC BLOOD PRESSURE: 64 MMHG | HEART RATE: 81 BPM | TEMPERATURE: 98 F

## 2025-03-06 DIAGNOSIS — E78.2 MIXED HYPERLIPIDEMIA: ICD-10-CM

## 2025-03-06 DIAGNOSIS — I27.20 PULMONARY HYPERTENSION (MULTI): ICD-10-CM

## 2025-03-06 DIAGNOSIS — J45.909 INTRINSIC ASTHMA (HHS-HCC): ICD-10-CM

## 2025-03-06 DIAGNOSIS — R20.9 BILATERAL COLD FEET: ICD-10-CM

## 2025-03-06 DIAGNOSIS — R06.02 SHORTNESS OF BREATH: ICD-10-CM

## 2025-03-06 DIAGNOSIS — I48.11 LONGSTANDING PERSISTENT ATRIAL FIBRILLATION (MULTI): ICD-10-CM

## 2025-03-06 DIAGNOSIS — Z00.00 ROUTINE GENERAL MEDICAL EXAMINATION AT HEALTH CARE FACILITY: Primary | ICD-10-CM

## 2025-03-06 DIAGNOSIS — M19.90 OSTEOARTHRITIS, UNSPECIFIED OSTEOARTHRITIS TYPE, UNSPECIFIED SITE: ICD-10-CM

## 2025-03-06 DIAGNOSIS — I10 PRIMARY HYPERTENSION: ICD-10-CM

## 2025-03-06 DIAGNOSIS — R73.01 IFG (IMPAIRED FASTING GLUCOSE): ICD-10-CM

## 2025-03-06 DIAGNOSIS — Z23 NEED FOR PNEUMOCOCCAL VACCINE: ICD-10-CM

## 2025-03-06 DIAGNOSIS — I49.5 SICK SINUS SYNDROME (MULTI): ICD-10-CM

## 2025-03-06 DIAGNOSIS — Z00.00 ENCOUNTER FOR ANNUAL WELLNESS VISIT (AWV) IN MEDICARE PATIENT: ICD-10-CM

## 2025-03-06 DIAGNOSIS — R20.2 BILATERAL LEG PARESTHESIA: ICD-10-CM

## 2025-03-06 PROCEDURE — 99214 OFFICE O/P EST MOD 30 MIN: CPT | Performed by: FAMILY MEDICINE

## 2025-03-06 PROCEDURE — G0009 ADMIN PNEUMOCOCCAL VACCINE: HCPCS | Performed by: FAMILY MEDICINE

## 2025-03-06 PROCEDURE — 90677 PCV20 VACCINE IM: CPT | Performed by: FAMILY MEDICINE

## 2025-03-06 PROCEDURE — 1158F ADVNC CARE PLAN TLK DOCD: CPT | Performed by: FAMILY MEDICINE

## 2025-03-06 PROCEDURE — G0439 PPPS, SUBSEQ VISIT: HCPCS | Performed by: FAMILY MEDICINE

## 2025-03-06 PROCEDURE — 1157F ADVNC CARE PLAN IN RCRD: CPT | Performed by: FAMILY MEDICINE

## 2025-03-06 PROCEDURE — 99497 ADVNCD CARE PLAN 30 MIN: CPT | Performed by: FAMILY MEDICINE

## 2025-03-06 PROCEDURE — 3075F SYST BP GE 130 - 139MM HG: CPT | Performed by: FAMILY MEDICINE

## 2025-03-06 PROCEDURE — 3078F DIAST BP <80 MM HG: CPT | Performed by: FAMILY MEDICINE

## 2025-03-06 PROCEDURE — 1159F MED LIST DOCD IN RCRD: CPT | Performed by: FAMILY MEDICINE

## 2025-03-06 PROCEDURE — G0444 DEPRESSION SCREEN ANNUAL: HCPCS | Performed by: FAMILY MEDICINE

## 2025-03-06 PROCEDURE — 1170F FXNL STATUS ASSESSED: CPT | Performed by: FAMILY MEDICINE

## 2025-03-06 PROCEDURE — 1036F TOBACCO NON-USER: CPT | Performed by: FAMILY MEDICINE

## 2025-03-06 PROCEDURE — 1123F ACP DISCUSS/DSCN MKR DOCD: CPT | Performed by: FAMILY MEDICINE

## 2025-03-06 ASSESSMENT — PATIENT HEALTH QUESTIONNAIRE - PHQ9
8. MOVING OR SPEAKING SO SLOWLY THAT OTHER PEOPLE COULD HAVE NOTICED. OR THE OPPOSITE, BEING SO FIGETY OR RESTLESS THAT YOU HAVE BEEN MOVING AROUND A LOT MORE THAN USUAL: NOT AT ALL
4. FEELING TIRED OR HAVING LITTLE ENERGY: MORE THAN HALF THE DAYS
7. TROUBLE CONCENTRATING ON THINGS, SUCH AS READING THE NEWSPAPER OR WATCHING TELEVISION: NOT AT ALL
2. FEELING DOWN, DEPRESSED OR HOPELESS: SEVERAL DAYS
SUM OF ALL RESPONSES TO PHQ QUESTIONS 1-9: 5
1. LITTLE INTEREST OR PLEASURE IN DOING THINGS: MORE THAN HALF THE DAYS
3. TROUBLE FALLING OR STAYING ASLEEP OR SLEEPING TOO MUCH: NOT AT ALL
6. FEELING BAD ABOUT YOURSELF - OR THAT YOU ARE A FAILURE OR HAVE LET YOURSELF OR YOUR FAMILY DOWN: NOT AT ALL
5. POOR APPETITE OR OVEREATING: NOT AT ALL
SUM OF ALL RESPONSES TO PHQ9 QUESTIONS 1 AND 2: 3
10. IF YOU CHECKED OFF ANY PROBLEMS, HOW DIFFICULT HAVE THESE PROBLEMS MADE IT FOR YOU TO DO YOUR WORK, TAKE CARE OF THINGS AT HOME, OR GET ALONG WITH OTHER PEOPLE: NOT DIFFICULT AT ALL
9. THOUGHTS THAT YOU WOULD BE BETTER OFF DEAD, OR OF HURTING YOURSELF: NOT AT ALL

## 2025-03-06 ASSESSMENT — ACTIVITIES OF DAILY LIVING (ADL)
DRESSING: INDEPENDENT
GROCERY_SHOPPING: INDEPENDENT
MANAGING_FINANCES: INDEPENDENT
BATHING: INDEPENDENT
TAKING_MEDICATION: INDEPENDENT
DOING_HOUSEWORK: INDEPENDENT

## 2025-03-06 ASSESSMENT — ENCOUNTER SYMPTOMS
LOSS OF SENSATION IN FEET: 1
DEPRESSION: 0
OCCASIONAL FEELINGS OF UNSTEADINESS: 1

## 2025-03-06 NOTE — LETTER
3/6/25    To Whom it may concern,  Cecilia Yee needs to have her garbage cans brought up to her house due to multiple medical chronic conditions.    Call with questions.    Signed,      Roman Rosado MD

## 2025-03-06 NOTE — PROGRESS NOTES
Subjective   Reason for Visit: Cecilia Yee is an 89 y.o. female here for a Medicare Wellness visit.     Defers shelby  Defers colon  Vaccs discussed  Prevnar 20 today  Utd on flu and covid  To pharm for rsv and shingles      Depression Screening  Depression screening completed using the PHQ-2 questions, with results documented in the chart/encounter (~5min).  (See Rooming Screening section for documentation, and/or progress note for additional information)    Cardiac Risk Assessment  The ASCVD Risk score (Gracie FOSTER, et al., 2019) failed to calculate for the following reasons:    The 2019 ASCVD risk score is only valid for ages 40 to 79    Risk score cannot be calculated because patient has a medical history suggesting prior/existing ASCVD     Cardiovascular risk was discussed and, if needed, lifestyle modifications recommended, including nutritional choices, exercise, and elimination of habits contributing to risk. We agreed on a plan to reduce the current cardiovascular risk based on above discussion as needed.     Aspirin use/disuse was discussed and documented in the Problem List of the medical record (under Cardiac Risk Counseling) after reviewing the updated guidelines below:  Consider low dose Aspirin ( mg) use if the benefit for cardiovascular disease prevention outweighs risk for bleeding complications.   Discussed that in general, low dose ASA should be considered:  In patients WITHOUT prior MI/stroke/PAD (primary prevention):   a. Age <60: Use if 10-year cardiovascular disease risk >20%, with discussion of risks and benefits with patient  b. Age 60-<70: Use if 10-year cardiovascular disease risk >20% and low bleeding (e.g., gastrointestinal) risk, with discussion of risks and benefits with patient  c. Age >=70: Do not use    In patients WITH prior MI/stroke/PAD (secondary prevention):   Generally use unless extremely high bleeding (e.g., gastrointenstinal) risk, with discussion of risks and  benefits with patient    Advance Directives Discussion  Advanced Care Planning (including a Living Will, Healthcare POA, as well as specific end of life choices and/or directives), was discussed with the patient and/or surrogate, voluntarily, and details of that discussion documented in the Problem List (under Advanced Directives Discussion) of the medical record.    (~16 min spent discussing above)     During the course of the visit the patient was educated and counseled about age appropriate screening and preventive services.   Completed preventive screenings were documented in the chart (see Routine Health Maintenance in Problem List) and orders were placed for outstanding screenings/procedures as documented in the Assessment and Plan.    Patient Instructions (the written plan) was given to the patient at check out.     Past Medical, Surgical, and Family History reviewed and updated in chart.    Reviewed all medications by prescribing practitioner or clinical pharmacist (such as prescriptions, OTCs, herbal therapies and supplements) and documented in the medical record.    HPI    Patient Care Team:  Davis Rosado MD as PCP - General  Davis Rosado MD as PCP - [facility]  Donnie Pichardo MD as Cardiologist (Cardiology)  PATRICIA Torres-CNP as Nurse Practitioner (Cardiology)     Review of Systems    Chronic disease management.  Active problems noted in [facility].  Electrophysiology follow-up is noted.  Tolerating anticoagulation.  No palpitations.  No chest pain.  Has occasional dyspnea on exertion but then recovers well.  She underwent pulmonary function test.  She has moderate COPD.  For now continue inhalation therapy.  She underwent nerve conduction study.  No neuropathy.  I discussed possibility of peripheral arterial disease as a source of her lower extremity symptoms cold feet and tingling.  Working to hold off on further diagnostic evaluation for now.  She will use  "compression stockings.  Warm socks.  Elevate her feet.  Otherwise general medical follow-up is noted labs are reviewed.  Controlled.    Symptoms plus lifestyle modifications reinforced.  Follow-up as scheduled.  Labs prior to follow-up.    Objective   Vitals:  /64 (BP Location: Left arm, Patient Position: Sitting, BP Cuff Size: Adult)   Pulse 81   Temp 36.7 °C (98 °F) (Temporal)   Ht 1.676 m (5' 6\")   Wt 58.2 kg (128 lb 3.2 oz)   SpO2 96% Comment: RA  BMI 20.69 kg/m²       Physical Exam    Assessment & Plan  Routine general medical examination at health care facility    Orders:    1 Year Follow Up In Primary Care - Wellness Exam; Future    Need for pneumococcal vaccine    Orders:    Pneumococcal conjugate vaccine, 20-valent (PREVNAR 20)    Encounter for annual wellness visit (AWV) in Medicare patient         Mixed hyperlipidemia    Orders:    Comprehensive metabolic panel; Future    Lipid panel; Future    Hemoglobin A1c; Future    Intrinsic asthma (HHS-HCC)         Osteoarthritis, unspecified osteoarthritis type, unspecified site         Primary hypertension    Orders:    Comprehensive metabolic panel; Future    Lipid panel; Future    Hemoglobin A1c; Future    Longstanding persistent atrial fibrillation (Multi)    Orders:    Comprehensive metabolic panel; Future    Lipid panel; Future    Hemoglobin A1c; Future    Shortness of breath         Bilateral leg paresthesia         Pulmonary hypertension (Multi)         Sick sinus syndrome (Multi)         Bilateral cold feet         IFG (impaired fasting glucose)    Orders:    Hemoglobin A1c; Future              "

## 2025-03-07 ENCOUNTER — TELEPHONE (OUTPATIENT)
Dept: PRIMARY CARE | Facility: CLINIC | Age: OVER 89
End: 2025-03-07
Payer: MEDICARE

## 2025-03-07 DIAGNOSIS — E78.2 MIXED HYPERLIPIDEMIA: Primary | ICD-10-CM

## 2025-03-07 DIAGNOSIS — R73.01 IFG (IMPAIRED FASTING GLUCOSE): ICD-10-CM

## 2025-03-07 NOTE — TELEPHONE ENCOUNTER
Patient goes to an outside lab. Loop do not work at [facility]. New orders placed reflecting external lab collect. Please advise review and sign

## 2025-03-13 ENCOUNTER — ANTI-COAG VISIT (OUTPATIENT)
Age: 89
End: 2025-03-13
Payer: MEDICARE

## 2025-03-13 DIAGNOSIS — I48.20 CHRONIC ATRIAL FIBRILLATION (HCC): Primary | ICD-10-CM

## 2025-03-13 LAB
INTERNATIONAL NORMALIZATION RATIO, POC: 2.9
PROTHROMBIN TIME, POC: 0

## 2025-03-13 PROCEDURE — 85610 PROTHROMBIN TIME: CPT | Performed by: PHARMACIST

## 2025-03-13 PROCEDURE — 99211 OFF/OP EST MAY X REQ PHY/QHP: CPT | Performed by: PHARMACIST

## 2025-03-13 NOTE — PROGRESS NOTES
Ms. India Osei is a 89 y.o. y/o female with history of Afib who presents today for anticoagulation monitoring and adjustment.  INR 2.9 is therapeutic for this patient (goal range 2-3) and is reflective of 20 mg TWD  Patient verifies current dosing regimen, patient able to verbally recall dose  Patient reports no  missed doses since last INR   Patient denies s/sx clotting and/or stroke  Patient denies hematuria, epistaxis, rectal bleeding  Patient denies changes in diet, alcohol, or tobacco use  Reviewed medication list and drug allergies with patient, updated any medication additions or modifications accordingly  Patient also denies any pending medical or dental procedures scheduled at this time  Patient was instructed to continue 20mg TWD and RTC 6 weeks    For Pharmacy Admin Tracking Only    Intervention Detail: Adherence Monitorin  Total # of Interventions Recommended: 1  Total # of Interventions Accepted: 1  Time Spent (min): 15

## 2025-03-31 DIAGNOSIS — E78.5 HYPERLIPIDEMIA, UNSPECIFIED HYPERLIPIDEMIA TYPE: ICD-10-CM

## 2025-03-31 RX ORDER — PRAVASTATIN SODIUM 40 MG/1
40 TABLET ORAL DAILY
Qty: 90 TABLET | Refills: 3 | Status: SHIPPED | OUTPATIENT
Start: 2025-03-31

## 2025-03-31 NOTE — TELEPHONE ENCOUNTER
Received request for prescription refills for patient.   Patient follows with Dr. Mitchell     Request is for Pravastatin   Is patient currently on medication yes    Last OV 7/12/24  Next OV 7/16/25    Pended for signing and sent to provider

## 2025-04-14 ENCOUNTER — HOSPITAL ENCOUNTER (OUTPATIENT)
Dept: CARDIOLOGY | Facility: HOSPITAL | Age: OVER 89
Discharge: HOME | End: 2025-04-14
Payer: MEDICARE

## 2025-04-14 DIAGNOSIS — I48.11 LONGSTANDING PERSISTENT ATRIAL FIBRILLATION (MULTI): ICD-10-CM

## 2025-04-14 DIAGNOSIS — I49.5 SICK SINUS SYNDROME (MULTI): ICD-10-CM

## 2025-04-14 DIAGNOSIS — Z95.0 PACEMAKER: ICD-10-CM

## 2025-04-14 PROCEDURE — 93296 REM INTERROG EVL PM/IDS: CPT

## 2025-04-14 PROCEDURE — 93294 REM INTERROG EVL PM/LDLS PM: CPT | Performed by: INTERNAL MEDICINE

## 2025-04-24 ENCOUNTER — ANTI-COAG VISIT (OUTPATIENT)
Age: 89
End: 2025-04-24
Payer: MEDICARE

## 2025-04-24 DIAGNOSIS — I48.91 ATRIAL FIBRILLATION, UNSPECIFIED TYPE (HCC): Primary | ICD-10-CM

## 2025-04-24 LAB
INTERNATIONAL NORMALIZATION RATIO, POC: 3 (ref 2–3)
PROTHROMBIN TIME, POC: 0

## 2025-04-24 PROCEDURE — 85610 PROTHROMBIN TIME: CPT

## 2025-04-24 PROCEDURE — 99211 OFF/OP EST MAY X REQ PHY/QHP: CPT

## 2025-04-24 NOTE — PROGRESS NOTES
Ms. India Osei is a 89 y.o. y/o female with history of Afib who presents today for anticoagulation monitoring and adjustment.    INR 3.0 is therapeutic for this patient (goal range 2.0-3.0) and is reflective of 20 mg TWD    Patient verifies current dosing regimen, patient able to verbally recall dose  Patient reports 0 missed doses since last INR   Patient denies s/sx clotting and/or stroke  Patient denies hematuria, epistaxis, rectal bleeding  Patient denies changes in diet, alcohol, or tobacco use  Reviewed medication list and drug allergies with patient, updated any medication additions or modifications accordingly  Patient also denies any pending medical or dental procedures scheduled at this time    Patient was instructed to continue 20 mg TWD and RTC 6 weeks    Shai MartínezD, BCPS  2025 9:24 AM      For Pharmacy Admin Tracking Only    Intervention Detail: Adherence Monitorin  Total # of Interventions Recommended: 1  Total # of Interventions Accepted: 1  Time Spent (min): 15

## 2025-06-05 ENCOUNTER — ANTI-COAG VISIT (OUTPATIENT)
Age: 89
End: 2025-06-05
Payer: MEDICARE

## 2025-06-05 DIAGNOSIS — I48.91 ATRIAL FIBRILLATION, UNSPECIFIED TYPE (HCC): Primary | ICD-10-CM

## 2025-06-05 LAB
INTERNATIONAL NORMALIZATION RATIO, POC: 3.6 (ref 2–3)
PROTHROMBIN TIME, POC: 0

## 2025-06-05 PROCEDURE — 99212 OFFICE O/P EST SF 10 MIN: CPT

## 2025-06-05 PROCEDURE — 85610 PROTHROMBIN TIME: CPT

## 2025-06-05 NOTE — PROGRESS NOTES
Ms. India Osei is a 89 y.o. y/o female with history of  Afib who presents today for anticoagulation monitoring and adjustment.    INR 3.6 is supra-therapeutic for this patient (goal range 2.0 - 3.0) and is reflective of 20 mg TWD    Patient verifies current dosing regimen, patient able to verbally recall dose  Patient reports 0  missed doses since last INR   Patient denies s/sx clotting and/or stroke  Patient denies hematuria, epistaxis, rectal bleeding  Patient denies changes in diet, alcohol, or tobacco use  Reviewed medication list and drug allergies with patient, denies any medication additions or modifications   Patient also denies any pending medical or dental procedures scheduled at this time    Patient was instructed to hold today's dose then continue with regular regimen of 20mg TWD and RTC 4 weeks    INR has been at the higher end of the target range over the last three visits. The patient reports easy bruising, which was confirmed during visit (notable bruising observed on the left arm). A dose reduction may be appropriate at the next visit if the INR remains supratherapeutic or at the higher end of the goal range.      Chet Hills, PharmD  PGY-1 Pharmacy Resident  [facility]      2025 9:13 AM      For Pharmacy Admin Tracking Only    Intervention Detail: Adherence Monitorin and Dose Adjustment: 1, reason: Therapy Optimization  Total # of Interventions Recommended: 2  Total # of Interventions Accepted: 2  Time Spent (min): 20

## 2025-07-02 ENCOUNTER — APPOINTMENT (OUTPATIENT)
Dept: CARDIOLOGY | Facility: CLINIC | Age: OVER 89
End: 2025-07-02
Payer: MEDICARE

## 2025-07-03 ENCOUNTER — ANTI-COAG VISIT (OUTPATIENT)
Age: 89
End: 2025-07-03
Payer: MEDICARE

## 2025-07-03 DIAGNOSIS — I48.91 ATRIAL FIBRILLATION, UNSPECIFIED TYPE (HCC): Primary | ICD-10-CM

## 2025-07-03 LAB
INTERNATIONAL NORMALIZATION RATIO, POC: 2.5
PROTHROMBIN TIME, POC: 0

## 2025-07-03 PROCEDURE — 85610 PROTHROMBIN TIME: CPT

## 2025-07-03 PROCEDURE — 99211 OFF/OP EST MAY X REQ PHY/QHP: CPT

## 2025-07-03 NOTE — PROGRESS NOTES
Ms. India Osei is a 89 y.o. y/o female with history of Afib who presents today for anticoagulation monitoring and adjustment.  INR 2.5 is therapeutic for this patient (goal range 2-3) and is reflective of 20 mg TWD  Patient verifies current dosing regimen, patient able to verbally recall dose  Patient reports 0  missed doses since last INR   Patient denies s/sx clotting and/or stroke  Patient denies hematuria, epistaxis, rectal bleeding  Patient denies changes in diet, alcohol, or tobacco use- Patient regularly receives Meals on Wheels which contains 2 types of vegetables with each meal that vary in vitmain K content, but patient is mindful of how much of the dark leafy greens she eats  Reviewed medication list and drug allergies with patient, updated any medication additions or modifications accordingly  Patient also denies any pending medical or dental procedures scheduled at this time  Patient was instructed to continue 2.5 mg daily, except 3.75 mg MF (20 mg TWD) and RTC 6 weeks    Mariaa Nunez, PharmD, BCPS   For Pharmacy Admin Tracking Only    Intervention Detail: Adherence Monitorin  Total # of Interventions Recommended: 1  Total # of Interventions Accepted: 1  Time Spent (min): 15

## 2025-07-16 ENCOUNTER — APPOINTMENT (OUTPATIENT)
Dept: CARDIOLOGY | Facility: CLINIC | Age: OVER 89
End: 2025-07-16
Payer: MEDICARE

## 2025-07-16 ENCOUNTER — APPOINTMENT (OUTPATIENT)
Dept: CARDIOLOGY | Facility: HOSPITAL | Age: OVER 89
End: 2025-07-16
Payer: MEDICARE

## 2025-07-18 ENCOUNTER — TELEPHONE (OUTPATIENT)
Dept: PRIMARY CARE | Facility: CLINIC | Age: OVER 89
End: 2025-07-18
Payer: MEDICARE

## 2025-07-18 DIAGNOSIS — R26.2 DISABILITY OF WALKING: Primary | ICD-10-CM

## 2025-07-23 ENCOUNTER — HOSPITAL ENCOUNTER (OUTPATIENT)
Dept: CARDIOLOGY | Facility: HOSPITAL | Age: OVER 89
Discharge: HOME | End: 2025-07-23
Payer: MEDICARE

## 2025-07-23 ENCOUNTER — APPOINTMENT (OUTPATIENT)
Dept: CARDIOLOGY | Facility: CLINIC | Age: OVER 89
End: 2025-07-23
Payer: MEDICARE

## 2025-07-23 VITALS
WEIGHT: 121 LBS | HEIGHT: 66 IN | BODY MASS INDEX: 19.44 KG/M2 | DIASTOLIC BLOOD PRESSURE: 60 MMHG | SYSTOLIC BLOOD PRESSURE: 118 MMHG | HEART RATE: 73 BPM

## 2025-07-23 DIAGNOSIS — Z95.0 PACEMAKER: ICD-10-CM

## 2025-07-23 DIAGNOSIS — I49.5 SICK SINUS SYNDROME (MULTI): ICD-10-CM

## 2025-07-23 DIAGNOSIS — I48.21 PERMANENT ATRIAL FIBRILLATION (MULTI): ICD-10-CM

## 2025-07-23 DIAGNOSIS — R94.31 ABNORMAL EKG: ICD-10-CM

## 2025-07-23 DIAGNOSIS — Z95.0 PACEMAKER: Primary | ICD-10-CM

## 2025-07-23 DIAGNOSIS — Z87.891 FORMER SMOKER: ICD-10-CM

## 2025-07-23 PROCEDURE — 93280 PM DEVICE PROGR EVAL DUAL: CPT

## 2025-07-23 PROCEDURE — 1159F MED LIST DOCD IN RCRD: CPT | Performed by: INTERNAL MEDICINE

## 2025-07-23 PROCEDURE — 93000 ELECTROCARDIOGRAM COMPLETE: CPT | Mod: DISTINCT PROCEDURAL SERVICE | Performed by: INTERNAL MEDICINE

## 2025-07-23 PROCEDURE — 93280 PM DEVICE PROGR EVAL DUAL: CPT | Performed by: INTERNAL MEDICINE

## 2025-07-23 PROCEDURE — 3078F DIAST BP <80 MM HG: CPT | Performed by: INTERNAL MEDICINE

## 2025-07-23 PROCEDURE — 3074F SYST BP LT 130 MM HG: CPT | Performed by: INTERNAL MEDICINE

## 2025-07-23 PROCEDURE — 99214 OFFICE O/P EST MOD 30 MIN: CPT | Performed by: INTERNAL MEDICINE

## 2025-07-23 ASSESSMENT — ENCOUNTER SYMPTOMS
DYSPNEA ON EXERTION: 0
PALPITATIONS: 0

## 2025-07-23 NOTE — PATIENT INSTRUCTIONS
Continue same medications/treatment.  Patient educated on proper medication use.  Patient educated on risk factor modification.  Please bring any lab results from other providers/physicians to your next appointment.    Please bring all medicines, vitamins, and herbal supplements with you when you come to the office.    Prescriptions will not be filled unless you are compliant with your follow up appointments or have a follow up appointment scheduled as per instruction of your physician. Refills should be requested at the time of your visit.    Follow up with Dr. Ambrosio in March with device check  Continue remote checks as scheduled    Meliza FIORE RN, AM SCRIBING FOR, AND IN THE PRESENCE OF DR. MALLIKA AMBROSIO MD

## 2025-07-23 NOTE — PROGRESS NOTES
CARDIOLOGY OFFICE VISIT      CHIEF COMPLAINT  Chief Complaint   Patient presents with    Follow-up     Pt. Present today for 1 year follow up for pacemaker and sick sinus syndrome        HISTORY OF PRESENT ILLNESS  HPI  89-year-old  female who is followed for sinus node dysfunction status post dual-chamber pacemaker implant on September 15, 2017 and persistent atrial fibrillation on amiodarone, verapamil, and anticoagulated with warfarin. She underwent an outpatient cardioversion on December 6, 2022 and presents to the office today for follow-up evaluation. Her INRs are monitored through the [facility]. 2D echocardiogram dated August 17, 2023 revealed an ejection fraction of 50 to 55%, moderate mitral valve thickening, 1+ tricuspid regurgitation, pulmonary hypertension with right ventricular systolic pressure 37 mmHg, and left atrial enlargement the left atrial size of 4.2 cm    Patient states that so far she has been doing well.  Denies any symptoms like chest pain or shortness breath or palpitations.    EKG performed today shows ventricular paced rhythm at rate of 73 bpm QRS duration 152 ms QT corrected 590 ms.  Rhythm strip shows the same pattern.    Device interrogation today at the device clinic shows dual-chamber pacemaker Medtronic with battery longevity 9 months.  [place] of atrial fibrillation 99% of the time but no high ventricular events noted.      Past Medical History  Medical History[1]    Social History  Social History[2]    Family History   Family History[3]     Allergies:  RX Allergies[4]     Outpatient Medications:  Current Outpatient Medications   Medication Instructions    aspirin 81 mg, Daily    betaxolol (Betoptic S) 0.25 % ophthalmic suspension 1 drop, Daily    bimatoprost (Lumigan) 0.03 % ophthalmic solution 1 drop, Daily RT    cholecalciferol (Vitamin D-3) 50 mcg (2,000 unit) capsule 1 capsule, Daily    cyanocobalamin (VITAMIN B-12) 1,000 mcg, Daily    polyethylene glycol  (GLYCOLAX, MIRALAX) 17 g, Every 12 hours    pravastatin (PRAVACHOL) 40 mg, oral, Daily    verapamil SR (Calan-SR) 120 mg ER tablet TAKE 1 TABLET AT NIGHT, USING 240 MG TABLET IN THE MORNING    verapamil SR (Calan-SR) 240 mg ER tablet TAKE 1 TABLET DAILY IN THE MORNING, TAKES 120 MG TABLET AT NIGHT    warfarin (COUMADIN) 2.5 mg, Daily          REVIEW OF SYSTEMS  Review of Systems   Cardiovascular:  Negative for chest pain, dyspnea on exertion and palpitations.   All other systems reviewed and are negative.        VITALS  Vitals:    07/23/25 1359   BP: 118/60   Pulse: 73       PHYSICAL EXAM  Constitutional:       General: Awake.      Appearance: Normal and healthy appearance. Well-developed and not in distress.   Neck:      Vascular: No JVR. JVD normal.   Pulmonary:      Effort: Pulmonary effort is normal.      Breath sounds: Normal breath sounds. No wheezing. No rhonchi. No rales.   Chest:      Chest wall: Not tender to palpatation.      Comments: Left sided device pocket- healed and well approximated. No swelling or hematoma      Cardiovascular:      PMI at left midclavicular line. Normal rate. Regular rhythm. Normal S1. Normal S2.       Murmurs: There is no murmur.      No gallop.  No click. No rub.   Pulses:     Intact distal pulses.   Edema:     Peripheral edema absent.   Abdominal:      Tenderness: There is no abdominal tenderness.   Musculoskeletal: Normal range of motion.         General: No tenderness. Skin:     General: Skin is warm and dry.   Neurological:      General: No focal deficit present.      Mental Status: Alert and oriented to person, place and time.         ASSESSMENT AND PLAN  Clinical impressions:  1. 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  2. Persistent atrial fibrillation with failure of sotalol to control the atrial fibrillation presently on a combination of amiodarone and verapamil and anticoagulated with warfarin  with twelve-lead EKG today revealing persistent atrial fibrillation.. Off amiodarone since 2023.  3. High risk medication.  4. History of hyperkalemia.  5. Hypertension, controlled   6. Dyslipidemia on statin.  7. Shortness of breath with exertion.  8. Normal left ventricular function per 2D echo dated February 10, 2022 with an ejection fraction of 60%.  9. Valvular heart disease consisting of mild aortic valve cusp calcification, mild mitral annular calcification, mild to moderate MR and mild TR per 2D echocardiogram dated February 10, 2022.  10. Pulmonary hypertension with right ventricular systolic pressure 53.7 mmHg per 2D echocardiogram dated June 26, 2020.  11. MRI of the brain dated February 10, 2022 revealing acute/subacute lacunar infarctions in the right parietal lobe white matter.  12. Carotid stenosis consisting of 40 to 50% left internal carotid artery and 50 to 60% distal left internal carotid artery stenosis and 20 to 30% right internal carotid artery stenosis.      Plan recommendation    From the electrophysical standpoint she is stable.  Her battery is getting close to STEFANIA.  She will be seen my office in 6 months.    Follow device clinic as scheduled per device protocol for battery close to STEFANIA.    Continue with verapamil therapy.    Continue warfarin therapy.    Risk factor modification and lifestyle modification discussed with patient. Diet , exercise and hydration discussed with patient.    I have personally review with patient during this office visit, laboratory data, echocardiogram results, stress test results, Holter-event monitor results prior and after the last electrophysiology visit. All questions has been answered.    Please excuse any errors in grammar or translation related to this dictation.  Voice recognition software was utilized to prepare this document.      I, Dr. Pichardo, personally performed the services described in the documentation as scribed by the nurse in my presence, and  confirm it is both accurate and complete.              [1]   Past Medical History:  Diagnosis Date    Anesthesia of skin 06/02/2021    Numbness and tingling    Essential (primary) hypertension 06/05/2021    HTN (hypertension), benign    Impacted cerumen, bilateral 02/19/2018    Bilateral impacted cerumen    Other malaise 06/05/2021    Physical deconditioning    Personal history of other (healed) physical injury and trauma 06/05/2021    History of contusion    Personal history of other diseases of the circulatory system 06/05/2021    History of atrial fibrillation    Personal history of other diseases of the digestive system 11/16/2021    History of gastroesophageal reflux (GERD)    Personal history of other diseases of the musculoskeletal system and connective tissue 06/05/2021    History of arthritis    Personal history of other diseases of the nervous system and sense organs 11/09/2021    History of carpal tunnel syndrome    Personal history of other medical treatment     History of screening mammography    Personal history of other medical treatment     History of screening mammography    Personal history of other medical treatment     History of screening mammography    Personal history of other medical treatment 11/16/2021    History of echocardiogram    Personal history of other specified conditions 06/05/2021    History of balance disorder    Unspecified abnormalities of breathing 06/02/2021    Breathing problem    Unspecified fall, initial encounter 06/05/2021    Fall, accidental    Unspecified fracture of the lower end of unspecified radius, initial encounter for closed fracture 06/05/2021    Distal radius fracture    Unspecified visual loss 06/02/2021    Vision problems   [2]   Social History  Tobacco Use    Smoking status: Former     Current packs/day: 0.00     Average packs/day: 0.5 packs/day for 14.0 years (7.0 ttl pk-yrs)     Types: Cigarettes     Start date: 1954     Quit date: 1968     Years since  quittin.5     Passive exposure: Past    Smokeless tobacco: Never   Vaping Use    Vaping status: Never Used   Substance Use Topics    Alcohol use: Never    Drug use: Never   [3]   Family History  Problem Relation Name Age of Onset    Coronary artery disease Mother      Stroke Mother      Diabetes Mother      Coronary artery disease Father      Alcohol abuse Brother      Brain cancer Brother     [4]   Allergies  Allergen Reactions    Ciprofloxacin Anaphylaxis and Shortness of breath    Ketorolac Anaphylaxis    Brimonidine Itching    Dorzolamide Itching and Swelling    Hydralazine Other and Unknown    Hydrochlorothiazide Other     hyponatremia    Sulfamethoxazole-Trimethoprim Unknown and Other     dizzy    Latex Hives and Rash    Prednisone Other and Palpitations     Hypertension; Tachycardia

## 2025-08-14 ENCOUNTER — ANTI-COAG VISIT (OUTPATIENT)
Age: 89
End: 2025-08-14
Payer: MEDICARE

## 2025-08-14 DIAGNOSIS — I48.91 ATRIAL FIBRILLATION, UNSPECIFIED TYPE (HCC): Primary | ICD-10-CM

## 2025-08-14 LAB
INTERNATIONAL NORMALIZATION RATIO, POC: 2.8
PROTHROMBIN TIME, POC: NORMAL

## 2025-08-14 PROCEDURE — 85610 PROTHROMBIN TIME: CPT | Performed by: PHARMACIST

## 2025-08-14 PROCEDURE — 99211 OFF/OP EST MAY X REQ PHY/QHP: CPT | Performed by: PHARMACIST

## 2025-09-11 ENCOUNTER — APPOINTMENT (OUTPATIENT)
Dept: PRIMARY CARE | Facility: CLINIC | Age: OVER 89
End: 2025-09-11
Payer: MEDICARE

## 2025-10-02 ENCOUNTER — APPOINTMENT (OUTPATIENT)
Dept: PRIMARY CARE | Facility: CLINIC | Age: OVER 89
End: 2025-10-02
Payer: MEDICARE

## 2026-03-18 ENCOUNTER — APPOINTMENT (OUTPATIENT)
Dept: CARDIOLOGY | Facility: CLINIC | Age: OVER 89
End: 2026-03-18
Payer: MEDICARE